# Patient Record
Sex: FEMALE | Race: WHITE | NOT HISPANIC OR LATINO | Employment: UNEMPLOYED | URBAN - NONMETROPOLITAN AREA
[De-identification: names, ages, dates, MRNs, and addresses within clinical notes are randomized per-mention and may not be internally consistent; named-entity substitution may affect disease eponyms.]

---

## 2017-01-03 RX ORDER — POTASSIUM CHLORIDE 750 MG/1
TABLET, FILM COATED, EXTENDED RELEASE ORAL
Qty: 30 TABLET | Refills: 7 | Status: SHIPPED | OUTPATIENT
Start: 2017-01-03 | End: 2020-02-11 | Stop reason: ALTCHOICE

## 2017-01-03 RX ORDER — MINOXIDIL 10 MG/1
TABLET ORAL
Qty: 30 TABLET | Refills: 7 | Status: SHIPPED | OUTPATIENT
Start: 2017-01-03 | End: 2020-02-11 | Stop reason: SDUPTHER

## 2017-03-30 ENCOUNTER — TELEPHONE (OUTPATIENT)
Dept: CARDIOLOGY | Facility: CLINIC | Age: 62
End: 2017-03-30

## 2017-03-30 RX ORDER — VALSARTAN AND HYDROCHLOROTHIAZIDE 320; 25 MG/1; MG/1
1 TABLET, FILM COATED ORAL DAILY
Qty: 90 TABLET | Refills: 1 | Status: SHIPPED | OUTPATIENT
Start: 2017-03-30 | End: 2018-03-30

## 2017-03-30 NOTE — TELEPHONE ENCOUNTER
Per Kyleigh Bebeto change valsartan hctz from 300/12.5 to Valsartan/HCTZ 320/25 mg daily. Script for Valsartan/HCTZ 320/25mg daily to pharmacy.

## 2017-03-30 NOTE — TELEPHONE ENCOUNTER
Trina from [facility] pharmacy called stating Edarbyclor 40-25mg is no longer covered on insurance and insurance requesting to change to valsartan/HCTZ, Irbesartan/HCTZ, or Lisinopril/HCTZ. What is your recommendations?

## 2017-03-30 NOTE — TELEPHONE ENCOUNTER
[facility] pharmacy has the same program as the [facility] yet Trina at [facility] states patient reports that she is unable to pay the 25.00 co pay and is requesting to change the medication.

## 2018-02-15 ENCOUNTER — OFFICE VISIT (OUTPATIENT)
Dept: CARDIOLOGY | Facility: CLINIC | Age: 63
End: 2018-02-15

## 2018-02-15 VITALS
DIASTOLIC BLOOD PRESSURE: 90 MMHG | HEIGHT: 58 IN | BODY MASS INDEX: 30.02 KG/M2 | WEIGHT: 143 LBS | HEART RATE: 76 BPM | SYSTOLIC BLOOD PRESSURE: 150 MMHG

## 2018-02-15 DIAGNOSIS — F32.89 OTHER DEPRESSION: ICD-10-CM

## 2018-02-15 DIAGNOSIS — M19.90 ARTHRITIS: ICD-10-CM

## 2018-02-15 DIAGNOSIS — I10 ESSENTIAL HYPERTENSION: Primary | ICD-10-CM

## 2018-02-15 DIAGNOSIS — M25.541 ARTHRALGIA OF BOTH HANDS: ICD-10-CM

## 2018-02-15 DIAGNOSIS — E11.9 TYPE 2 DIABETES MELLITUS WITHOUT COMPLICATION, WITHOUT LONG-TERM CURRENT USE OF INSULIN (HCC): ICD-10-CM

## 2018-02-15 DIAGNOSIS — E78.00 PURE HYPERCHOLESTEROLEMIA: ICD-10-CM

## 2018-02-15 DIAGNOSIS — E03.9 ACQUIRED HYPOTHYROIDISM: ICD-10-CM

## 2018-02-15 DIAGNOSIS — M25.542 ARTHRALGIA OF BOTH HANDS: ICD-10-CM

## 2018-02-15 PROCEDURE — 99214 OFFICE O/P EST MOD 30 MIN: CPT | Performed by: NURSE PRACTITIONER

## 2018-02-15 RX ORDER — LEVOTHYROXINE SODIUM 0.07 MG/1
75 TABLET ORAL DAILY
COMMUNITY
End: 2020-02-11 | Stop reason: ALTCHOICE

## 2018-02-15 RX ORDER — DULOXETIN HYDROCHLORIDE 30 MG/1
30 CAPSULE, DELAYED RELEASE ORAL 2 TIMES DAILY
COMMUNITY
End: 2020-08-11 | Stop reason: ALTCHOICE

## 2018-02-15 NOTE — PROGRESS NOTES
Chief Complaint   Patient presents with   • Follow-up     For cardiac management. Last lab work done on 02/08/18.    • Med Refill     PCP does medication refills.        Cardiac Complaints  none      Subjective   Ana Harry is a 62 y.o. female with hypertension, diabetes, hypothyroidism, and hypercholesterolemia. Most recent stress from 2016 showed normal ischemic burden and good LV function.  She had been treated for sometime on edarbyclor therapy for HTN management, but insurance stopped paying and she was changed to diovan/HCTZ.  She has tolerated well.  She returns today for follow up and denies any new cardiac concerns.  Chest pain, shortness of breath, and palpitations are denied.  She does admit to some depression since the death of her sister in September and has had a difficult time dealing with it.She also reports her main concerns are related to her arthritis as she has been having a great deal of joint pain in her hands. Labs were recently done with PCP and most recent to the chart from September show a K  4.7, H/H 14.4/44.3,  AIC of 6.3%, TSH 6.53, HDL 59, and LDL of 121.  No refills are needed today as she reports that they were recently done with PCP.        Cardiac History  Past Surgical History:   Procedure Laterality Date   • CARDIOVASCULAR STRESS TEST  04/24/2002    7 min, 86% THR, Neg   • CARDIOVASCULAR STRESS TEST  03/23/2010    4 min, 30 sec 88% THR. /88. Neg   • CARDIOVASCULAR STRESS TEST  07/20/2012    L. Myoview- Negative   • CARDIOVASCULAR STRESS TEST  01/28/2016    R.Stress- 7 Min, 91% THR. /104. Negative   • CONVERTED (HISTORICAL) HOLTER  03/30/2004    AVG HR 68BPM   • ECHO - CONVERTED  09/17/2007    EF >60% RVSP 42 mmHg   • ECHO - CONVERTED  11/17/2008    EF >60%. RVSP 37 mmHg   • ECHO - CONVERTED  07/08/2010    EF 65% RVSP- 50 mmHg   • ECHO - CONVERTED  10/17/2011    EF >60% RVSP-43 mmHg   • ECHO - CONVERTED  03/13/2014    EF 65% RVSP- 26mmHg   • ECHO - CONVERTED   01/12/2016    EF 65%   • US CAROTID UNILATERAL  03/20/2014    Normal       Current Outpatient Prescriptions   Medication Sig Dispense Refill   • aspirin  MG tablet Take 325 mg by mouth daily.     • clonazePAM (KLONOPIN) 1 MG tablet Take 1 mg by mouth every night.     • cloNIDine (CATAPRES) 0.2 MG tablet Take 0.2 mg by mouth 2 (two) times a day.     • digoxin (LANOXIN) 250 MCG tablet Take 250 mcg by mouth every day.     • DULoxetine (CYMBALTA) 30 MG capsule Take 30 mg by mouth 2 (Two) Times a Day.     • fluticasone (VERAMYST) 27.5 MCG/SPRAY nasal spray 2 sprays into each nostril daily.     • furosemide (LASIX) 40 MG tablet TAKE 1 TABLET AS NEEDED 30 tablet 7   • gabapentin (NEURONTIN) 800 MG tablet Take 800 mg by mouth 3 (three) times a day.     • glipiZIDE (GLUCOTROL) 2.5 MG 24 hr tablet Take 2.5 mg by mouth daily.     • hydroxychloroquine (PLAQUENIL) 200 MG tablet Take  by mouth 2 (two) times a day.     • levothyroxine (SYNTHROID, LEVOTHROID) 75 MCG tablet Take 75 mcg by mouth Daily.     • loratadine (CLARITIN) 10 MG tablet Take 10 mg by mouth daily.     • meloxicam (MOBIC) 15 MG tablet Take 15 mg by mouth daily.     • metFORMIN (GLUCOPHAGE) 500 MG tablet Take 500 mg by mouth 2 (two) times a day with meals.     • minoxidil (LONITEN) 10 MG tablet TAKE ONE TABLET BY MOUTH DAILY 30 tablet 7   • NIFEdipine XL (PROCARDIA XL) 90 MG 24 hr tablet Take 90 mg by mouth daily.     • potassium chloride (K-DUR) 10 MEQ CR tablet TAKE 1 TABLET DAILY AS NEEDED 30 tablet 7   • valsartan-hydrochlorothiazide (DIOVAN-HCT) 320-25 MG per tablet Take 1 tablet by mouth Daily. 90 tablet 1   • pravastatin (PRAVACHOL) 80 MG tablet Take 80 mg by mouth daily.       No current facility-administered medications for this visit.        Codeine; Iodine; and Morphine and related    Past Medical History:   Diagnosis Date   • Aortic insufficiency    • Diabetes mellitus    • Hyperlipidemia    • Hypertension    • Palpitations    • Restless legs  "syndrome (RLS)     followed by Dr Atkins       Social History     Social History   • Marital status:      Spouse name: N/A   • Number of children: N/A   • Years of education: N/A     Occupational History   • Not on file.     Social History Main Topics   • Smoking status: Never Smoker   • Smokeless tobacco: Never Used   • Alcohol use No   • Drug use: No   • Sexual activity: Not on file     Other Topics Concern   • Not on file     Social History Narrative       Family History   Problem Relation Age of Onset   • Diabetes Mother    • Heart disease Mother        Review of Systems   Constitution: Negative for weakness and malaise/fatigue.   Cardiovascular: Negative for chest pain, dyspnea on exertion, leg swelling, palpitations and syncope.   Respiratory: Negative for shortness of breath and wheezing.    Musculoskeletal: Positive for arthritis, joint pain and joint swelling.   Gastrointestinal: Negative for anorexia and heartburn.   Genitourinary: Negative for dysuria, hesitancy and nocturia.   Neurological: Negative for dizziness, light-headedness and loss of balance.   Psychiatric/Behavioral: Positive for depression. Negative for memory loss. The patient is not nervous/anxious.        Diabetes YES  Thyroidabnormal    Objective     /90 (BP Location: Left arm)  Pulse 76  Ht 147.3 cm (57.99\")  Wt 64.9 kg (143 lb)  BMI 29.9 kg/m2    Physical Exam   Constitutional: She is oriented to person, place, and time. She appears well-developed and well-nourished.   HENT:   Head: Normocephalic and atraumatic.   Eyes: EOM are normal. Pupils are equal, round, and reactive to light.   Neck: Normal range of motion. Neck supple.   Cardiovascular: Normal rate and regular rhythm.    Murmur heard.  Pulmonary/Chest: Effort normal and breath sounds normal.   Abdominal: Soft.   Musculoskeletal: Normal range of motion.   Neurological: She is alert and oriented to person, place, and time.   Skin: Skin is warm and dry. "   Psychiatric: She has a normal mood and affect. Her behavior is normal.       Procedures    Assessment/Plan     HR is stable today.  BP is elevated at 150/90, she states since she has been taking her valsartan/HCTZ it has been very well controlled at home.  It is elevated today, but she admits to not having her valsartan/HCTZ as she would be in the car today and did not take it.  Most recent TSH available for review was elevated at 6.53, she states you had added back a low dose of synthroid.  Labs also showed an elevated LDL of 121, discussed with her this may be because of the hypothyroidism but could be her diet also.  She is taking her pravachol now at 80mg QHS but admits she had not been taking her medication nightly to save money.  Most recent AIC reported at 6.3%, she continues on metformin and glipizide.  Medication compliance discussed.  She has been watching her diet closer and her weight is now down about 9 pounds.  She does admit to walking more and states she does so without cardiac concerns.  She continues to have issues with arthritis pain, and reports  she did follow with rheumatology but is no longer following due to costs.  She does report labs will be done again soon.  Could we get next for our records?  No refills are needed today as they were filled recently with you.  No new cardiac workup advised as no new concerns voiced.  6 month follow up scheduled or sooner if needed.    Problems Addressed this Visit        Cardiovascular and Mediastinum    Essential hypertension - Primary    Pure hypercholesterolemia       Endocrine    Type 2 diabetes mellitus without complication, without long-term current use of insulin    Acquired hypothyroidism    Relevant Medications    levothyroxine (SYNTHROID, LEVOTHROID) 75 MCG tablet       Musculoskeletal and Integument    Arthritis      Other Visit Diagnoses     Arthralgia of both hands        Other depression        Relevant Medications    DULoxetine (CYMBALTA)  30 MG capsule                  Electronically signed by Kyleigh Tate, APRN February 15, 2018 3:41 PM

## 2019-02-18 ENCOUNTER — TELEPHONE (OUTPATIENT)
Dept: CARDIOLOGY | Facility: CLINIC | Age: 64
End: 2019-02-18

## 2019-02-18 ENCOUNTER — OFFICE VISIT (OUTPATIENT)
Dept: CARDIOLOGY | Facility: CLINIC | Age: 64
End: 2019-02-18

## 2019-02-18 VITALS
DIASTOLIC BLOOD PRESSURE: 90 MMHG | BODY MASS INDEX: 31.49 KG/M2 | HEART RATE: 72 BPM | WEIGHT: 150 LBS | HEIGHT: 58 IN | SYSTOLIC BLOOD PRESSURE: 168 MMHG

## 2019-02-18 DIAGNOSIS — M19.90 ARTHRITIS: ICD-10-CM

## 2019-02-18 DIAGNOSIS — I25.6 SILENT MYOCARDIAL ISCHEMIA: ICD-10-CM

## 2019-02-18 DIAGNOSIS — E78.00 PURE HYPERCHOLESTEROLEMIA: ICD-10-CM

## 2019-02-18 DIAGNOSIS — E03.9 ACQUIRED HYPOTHYROIDISM: ICD-10-CM

## 2019-02-18 DIAGNOSIS — R00.2 PALPITATIONS: Primary | ICD-10-CM

## 2019-02-18 DIAGNOSIS — E66.9 OBESITY (BMI 30.0-34.9): ICD-10-CM

## 2019-02-18 DIAGNOSIS — E11.9 TYPE 2 DIABETES MELLITUS WITHOUT COMPLICATION, WITHOUT LONG-TERM CURRENT USE OF INSULIN (HCC): ICD-10-CM

## 2019-02-18 DIAGNOSIS — I10 ESSENTIAL HYPERTENSION: ICD-10-CM

## 2019-02-18 DIAGNOSIS — I20.8 ANGINAL EQUIVALENT (HCC): ICD-10-CM

## 2019-02-18 DIAGNOSIS — R06.02 SHORTNESS OF BREATH: ICD-10-CM

## 2019-02-18 PROCEDURE — 99214 OFFICE O/P EST MOD 30 MIN: CPT | Performed by: NURSE PRACTITIONER

## 2019-02-18 RX ORDER — LOSARTAN POTASSIUM AND HYDROCHLOROTHIAZIDE 25; 100 MG/1; MG/1
1 TABLET ORAL DAILY
Qty: 30 TABLET | Refills: 8
Start: 2019-02-18 | End: 2020-02-11 | Stop reason: ALTCHOICE

## 2019-02-18 RX ORDER — CARVEDILOL 3.12 MG/1
3.12 TABLET ORAL 2 TIMES DAILY
Qty: 180 TABLET | Refills: 3 | Status: SHIPPED | OUTPATIENT
Start: 2019-02-18 | End: 2019-05-19

## 2019-02-18 RX ORDER — DIGOXIN 125 MCG
125 TABLET ORAL DAILY
Qty: 90 TABLET | Refills: 2 | Status: SHIPPED | OUTPATIENT
Start: 2019-02-18 | End: 2020-02-11 | Stop reason: ALTCHOICE

## 2019-02-18 RX ORDER — LOSARTAN POTASSIUM AND HYDROCHLOROTHIAZIDE 25; 100 MG/1; MG/1
1 TABLET ORAL DAILY
COMMUNITY
End: 2019-02-18 | Stop reason: HOSPADM

## 2019-02-18 NOTE — TELEPHONE ENCOUNTER
Please advise patient digoxin sent as 0.125mg as she is going to be taking beta blocker as well for better blood pressure control cut down from 0.250mg.

## 2019-02-18 NOTE — PROGRESS NOTES
Chief Complaint   Patient presents with   • Follow-up     For cardiac management. Patient is on aspirin. Reports that sometimes her heart races, reports that it doesn't happen every day. Last lab work was done in October per PCP, not in chart.    • Med Refill     Refills with PCP       Cardiac Complaints  dyspnea and palpitations      Subjective   Ana Harry is a 63 y.o. female with hypertension, diabetes, hypothyroidism, and hypercholesterolemia. Most recent stress from 2016 showed normal ischemic burden and good LV function.  She had been treated for sometime on edarbyclor therapy for HTN management, but insurance stopped paying and she was changed to diovan/HCTZ, she then states diovan was changed by insurance to losartan.  She has tolerated well.  Most recent cardiac workup in 2016 was negative for ischemia and good LV function noted.  She returns today for follow up and reports issues with palpitations in the form of her heart racing at times.  She admits it does not happen every day but does happen several times a week. She reports some shortness of breath with exertion but states it is no worse than prior. She admits that blood pressure medication was changed by her insurance company when they reported a problem with valsartan therapy.  She states since that time, her blood pressure has been hard to control with readings as high as 190-200 systolic.  She does use clonidine if needed for elevated readings. Labs she admits remain monitored by PCP, no recent available for review.  No refills currently needed.             Cardiac History  Past Surgical History:   Procedure Laterality Date   • CARDIOVASCULAR STRESS TEST  04/24/2002    7 min, 86% THR, Neg   • CARDIOVASCULAR STRESS TEST  03/23/2010    4 min, 30 sec 88% THR. /88. Neg   • CARDIOVASCULAR STRESS TEST  07/20/2012    L. Myoview- Negative   • CARDIOVASCULAR STRESS TEST  01/28/2016    R.Stress- 7 Min, 91% THR. /104. Negative   • CONVERTED  (HISTORICAL) HOLTER  03/30/2004    AVG HR 68BPM   • ECHO - CONVERTED  09/17/2007    EF >60% RVSP 42 mmHg   • ECHO - CONVERTED  11/17/2008    EF >60%. RVSP 37 mmHg   • ECHO - CONVERTED  07/08/2010    EF 65% RVSP- 50 mmHg   • ECHO - CONVERTED  10/17/2011    EF >60% RVSP-43 mmHg   • ECHO - CONVERTED  03/13/2014    EF 65% RVSP- 26mmHg   • ECHO - CONVERTED  01/12/2016    EF 65%   • US CAROTID UNILATERAL  03/20/2014    Normal       Current Outpatient Medications   Medication Sig Dispense Refill   • aspirin  MG tablet Take 325 mg by mouth daily.     • cloNIDine (CATAPRES) 0.2 MG tablet Take 0.2 mg by mouth 3 (Three) Times a Day.     • DULoxetine (CYMBALTA) 30 MG capsule Take 30 mg by mouth 2 (Two) Times a Day.     • fluticasone (VERAMYST) 27.5 MCG/SPRAY nasal spray 2 sprays into each nostril daily.     • furosemide (LASIX) 40 MG tablet TAKE 1 TABLET AS NEEDED 30 tablet 7   • gabapentin (NEURONTIN) 800 MG tablet Take 800 mg by mouth 3 (three) times a day.     • glipiZIDE (GLUCOTROL) 2.5 MG 24 hr tablet Take 2.5 mg by mouth 2 (Two) Times a Day.     • hydroxychloroquine (PLAQUENIL) 200 MG tablet Take  by mouth 2 (two) times a day.     • meloxicam (MOBIC) 15 MG tablet Take 15 mg by mouth daily.     • metFORMIN (GLUCOPHAGE) 500 MG tablet Take 500 mg by mouth 2 (two) times a day with meals.     • minoxidil (LONITEN) 10 MG tablet TAKE ONE TABLET BY MOUTH DAILY 30 tablet 7   • NIFEdipine XL (PROCARDIA XL) 90 MG 24 hr tablet Take 90 mg by mouth daily.     • pravastatin (PRAVACHOL) 80 MG tablet Take 80 mg by mouth daily.     • carvedilol (COREG) 3.125 MG tablet Take 1 tablet by mouth 2 (Two) Times a Day for 90 days. 180 tablet 3   • clonazePAM (KLONOPIN) 1 MG tablet Take 1 mg by mouth every night.     • digoxin (LANOXIN) 125 MCG tablet Take 1 tablet by mouth Daily. 90 tablet 2   • levothyroxine (SYNTHROID, LEVOTHROID) 75 MCG tablet Take 75 mcg by mouth Daily.     • loratadine (CLARITIN) 10 MG tablet Take 10 mg by mouth daily.      • losartan-hydrochlorothiazide (HYZAAR) 100-25 MG per tablet Take 1 tablet by mouth Daily. 30 tablet 8   • potassium chloride (K-DUR) 10 MEQ CR tablet TAKE 1 TABLET DAILY AS NEEDED 30 tablet 7     No current facility-administered medications for this visit.        Codeine; Iodine; Morphine and related; and Other    Past Medical History:   Diagnosis Date   • Aortic insufficiency    • Diabetes mellitus (CMS/HCC)    • Hyperlipidemia    • Hypertension    • Palpitations    • Restless legs syndrome (RLS)     followed by Dr Atkins       Social History     Socioeconomic History   • Marital status:      Spouse name: Not on file   • Number of children: Not on file   • Years of education: Not on file   • Highest education level: Not on file   Social Needs   • Financial resource strain: Not on file   • Food insecurity - worry: Not on file   • Food insecurity - inability: Not on file   • Transportation needs - medical: Not on file   • Transportation needs - non-medical: Not on file   Occupational History   • Not on file   Tobacco Use   • Smoking status: Never Smoker   • Smokeless tobacco: Never Used   Substance and Sexual Activity   • Alcohol use: No   • Drug use: No   • Sexual activity: Not on file   Other Topics Concern   • Not on file   Social History Narrative   • Not on file       Family History   Problem Relation Age of Onset   • Diabetes Mother    • Heart disease Mother        Review of Systems   Constitution: Negative for weakness and malaise/fatigue.   Cardiovascular: Positive for dyspnea on exertion and palpitations. Negative for chest pain, claudication, irregular heartbeat, leg swelling, near-syncope, orthopnea and syncope.   Respiratory: Positive for shortness of breath. Negative for cough and wheezing.    Musculoskeletal: Positive for arthritis, joint pain and joint swelling.   Gastrointestinal: Negative for anorexia, heartburn, nausea and vomiting.   Genitourinary: Negative for dysuria, hematuria,  "hesitancy and nocturia.   Neurological: Negative for dizziness, light-headedness and loss of balance.   Psychiatric/Behavioral: Negative for depression and memory loss. The patient is not nervous/anxious.            Objective     /90 (BP Location: Left arm)   Pulse 72   Ht 147.3 cm (57.99\")   Wt 68 kg (150 lb)   BMI 31.36 kg/m²     Physical Exam   Constitutional: She is oriented to person, place, and time. She appears well-developed and well-nourished.   HENT:   Head: Normocephalic and atraumatic.   Eyes: EOM are normal. Pupils are equal, round, and reactive to light.   Neck: Normal range of motion. Neck supple.   Cardiovascular: Normal rate and regular rhythm.   Pulmonary/Chest: Effort normal and breath sounds normal.   Abdominal: Soft.   Musculoskeletal: Normal range of motion.   Neurological: She is alert and oriented to person, place, and time.   Skin: Skin is warm and dry.   Psychiatric: She has a normal mood and affect. Her behavior is normal.       Procedures    Assessment/Plan     HR is stable today and regular.  Blood pressure is not well controlled. Since patient reports palpitations described as heart racing and elevated blood pressure noted, coreg will be added at 3.125mg BID to current regimen.  To avoid marked bradycardia, digoxin dosing will be decreased to 0.125mg daily.  She was urged to continue to monitor blood pressure at home and use her clonidine if SBP > or equal to 160.  Repeat cardiac workup with modified stress mirian and echo will be ordered to assess functional status, for any arrhythmias, ischemic burden, or structural concerns as it has been 3 years since last workup and risk of silent ischemic burden present, along with multiple risk factors for CAD.  More recommendations to follow.  For hyperlipidemia management, she has remained on statin therapy and tolerates well, side effects are denied. For now, no adjustment to current regimen recommended.  Labs she admits are done with " your office including FLP for hyperlipidemia and AIC for DM management.  Could we have next for our review?  BMI remains elevated at over 30 and patient admits to a high sodium/high carb diet.  She was counseled on the importance to limit both for better HTN control and DM management.  She was also urged to limit her caloric intake and increase physical activity as tolerated.  6 month follow up scheduled or sooner if needed.        Problems Addressed this Visit        Cardiovascular and Mediastinum    Essential hypertension    Relevant Medications    losartan-hydrochlorothiazide (HYZAAR) 100-25 MG per tablet    carvedilol (COREG) 3.125 MG tablet    Pure hypercholesterolemia       Endocrine    Type 2 diabetes mellitus without complication, without long-term current use of insulin (CMS/HCC)    Acquired hypothyroidism    Relevant Medications    carvedilol (COREG) 3.125 MG tablet       Musculoskeletal and Integument    Arthritis      Other Visit Diagnoses     Palpitations    -  Primary    Relevant Orders    Adult Transthoracic Echo Complete W/ Cont if Necessary Per Protocol    Stress Test With Myocardial Perfusion One Day    Shortness of breath        Relevant Orders    Adult Transthoracic Echo Complete W/ Cont if Necessary Per Protocol    Stress Test With Myocardial Perfusion One Day    Anginal equivalent (CMS/HCC)        Relevant Medications    carvedilol (COREG) 3.125 MG tablet    digoxin (LANOXIN) 125 MCG tablet    Other Relevant Orders    Adult Transthoracic Echo Complete W/ Cont if Necessary Per Protocol    Stress Test With Myocardial Perfusion One Day    Silent myocardial ischemia        Relevant Medications    carvedilol (COREG) 3.125 MG tablet    digoxin (LANOXIN) 125 MCG tablet    Other Relevant Orders    Adult Transthoracic Echo Complete W/ Cont if Necessary Per Protocol    Stress Test With Myocardial Perfusion One Day    Obesity (BMI 30.0-34.9)              Patient's Body mass index is 31.36 kg/m². BMI is  above normal parameters. Recommendations include: nutrition counseling.            Electronically signed by CHARLES Harden February 18, 2019 4:10 PM

## 2019-02-18 NOTE — TELEPHONE ENCOUNTER
Patient aware of dose adjustment of digoxin 0.125 mg QD from 0.250 mg due to addition of beta blocker.

## 2019-03-05 ENCOUNTER — HOSPITAL ENCOUNTER (OUTPATIENT)
Dept: CARDIOLOGY | Facility: HOSPITAL | Age: 64
Discharge: HOME | End: 2019-03-05

## 2019-03-05 DIAGNOSIS — I25.6 SILENT MYOCARDIAL ISCHEMIA: ICD-10-CM

## 2019-03-05 DIAGNOSIS — I20.8 ANGINAL EQUIVALENT (HCC): ICD-10-CM

## 2019-03-05 DIAGNOSIS — R06.02 SHORTNESS OF BREATH: ICD-10-CM

## 2019-03-05 DIAGNOSIS — R00.2 PALPITATIONS: ICD-10-CM

## 2019-03-05 LAB
BH CV ECHO MEAS - ACS: 2.4 CM
BH CV ECHO MEAS - AO MEAN PG (FULL): 1.2 MMHG
BH CV ECHO MEAS - AO MEAN PG: 3 MMHG
BH CV ECHO MEAS - AO ROOT AREA (BSA CORRECTED): 2
BH CV ECHO MEAS - AO ROOT AREA: 7.6 CM^2
BH CV ECHO MEAS - AO ROOT DIAM: 3.1 CM
BH CV ECHO MEAS - AO V2 MEAN: 82.8 CM/SEC
BH CV ECHO MEAS - AO V2 VTI: 27.3 CM
BH CV ECHO MEAS - AVA(I,A): 2.4 CM^2
BH CV ECHO MEAS - AVA(I,D): 2.4 CM^2
BH CV ECHO MEAS - BSA(HAYCOCK): 1.7 M^2
BH CV ECHO MEAS - BSA: 1.6 M^2
BH CV ECHO MEAS - BZI_BMI: 32.5 KILOGRAMS/M^2
BH CV ECHO MEAS - BZI_METRIC_HEIGHT: 144.8 CM
BH CV ECHO MEAS - BZI_METRIC_WEIGHT: 68 KG
BH CV ECHO MEAS - EDV(CUBED): 69.5 ML
BH CV ECHO MEAS - EDV(MOD-SP4): 63 ML
BH CV ECHO MEAS - EDV(TEICH): 74.7 ML
BH CV ECHO MEAS - EF(CUBED): 79 %
BH CV ECHO MEAS - EF(MOD-SP4): 79.4 %
BH CV ECHO MEAS - EF(TEICH): 71.8 %
BH CV ECHO MEAS - ESV(CUBED): 14.6 ML
BH CV ECHO MEAS - ESV(MOD-SP4): 13 ML
BH CV ECHO MEAS - ESV(TEICH): 21.1 ML
BH CV ECHO MEAS - FS: 40.6 %
BH CV ECHO MEAS - IVS/LVPW: 1.3
BH CV ECHO MEAS - IVSD: 1.7 CM
BH CV ECHO MEAS - LA DIMENSION: 3.8 CM
BH CV ECHO MEAS - LA/AO: 1.2
BH CV ECHO MEAS - LV DIASTOLIC VOL/BSA (35-75): 39.6 ML/M^2
BH CV ECHO MEAS - LV IVRT: 0.12 SEC
BH CV ECHO MEAS - LV MASS(C)D: 235.9 GRAMS
BH CV ECHO MEAS - LV MASS(C)DI: 148.3 GRAMS/M^2
BH CV ECHO MEAS - LV MEAN PG: 1.7 MMHG
BH CV ECHO MEAS - LV SYSTOLIC VOL/BSA (12-30): 8.2 ML/M^2
BH CV ECHO MEAS - LV V1 MEAN: 61.2 CM/SEC
BH CV ECHO MEAS - LV V1 VTI: 20.7 CM
BH CV ECHO MEAS - LVIDD: 4.1 CM
BH CV ECHO MEAS - LVIDS: 2.4 CM
BH CV ECHO MEAS - LVLD AP4: 6.1 CM
BH CV ECHO MEAS - LVLS AP4: 4.7 CM
BH CV ECHO MEAS - LVOT AREA (M): 3.1 CM^2
BH CV ECHO MEAS - LVOT AREA: 3.1 CM^2
BH CV ECHO MEAS - LVOT DIAM: 2 CM
BH CV ECHO MEAS - LVPWD: 1.3 CM
BH CV ECHO MEAS - MV A MAX VEL: 112 CM/SEC
BH CV ECHO MEAS - MV DEC SLOPE: 429 CM/SEC^2
BH CV ECHO MEAS - MV E MAX VEL: 118.5 CM/SEC
BH CV ECHO MEAS - MV E/A: 1.1
BH CV ECHO MEAS - MV MEAN PG: 2.4 MMHG
BH CV ECHO MEAS - MV P1/2T MAX VEL: 129 CM/SEC
BH CV ECHO MEAS - MV P1/2T: 88 MSEC
BH CV ECHO MEAS - MV V2 MEAN: 70.9 CM/SEC
BH CV ECHO MEAS - MV V2 VTI: 46.1 CM
BH CV ECHO MEAS - MVA P1/2T LCG: 1.7 CM^2
BH CV ECHO MEAS - MVA(P1/2T): 2.5 CM^2
BH CV ECHO MEAS - MVA(VTI): 1.4 CM^2
BH CV ECHO MEAS - RAP SYSTOLE: 10 MMHG
BH CV ECHO MEAS - RVDD: 2.8 CM
BH CV ECHO MEAS - RVSP: 31.3 MMHG
BH CV ECHO MEAS - SI(AO): 130.9 ML/M^2
BH CV ECHO MEAS - SI(CUBED): 34.5 ML/M^2
BH CV ECHO MEAS - SI(LVOT): 40.8 ML/M^2
BH CV ECHO MEAS - SI(MOD-SP4): 31.4 ML/M^2
BH CV ECHO MEAS - SI(TEICH): 33.7 ML/M^2
BH CV ECHO MEAS - SV(AO): 208.4 ML
BH CV ECHO MEAS - SV(CUBED): 54.9 ML
BH CV ECHO MEAS - SV(LVOT): 64.9 ML
BH CV ECHO MEAS - SV(MOD-SP4): 50 ML
BH CV ECHO MEAS - SV(TEICH): 53.6 ML
BH CV ECHO MEAS - TR MAX VEL: 230.8 CM/SEC
BH CV STRESS RECOVERY BP: NORMAL MMHG
BH CV STRESS RECOVERY HR: 90 BPM
LV EF NUC BP: 64 %
MAXIMAL PREDICTED HEART RATE: 156 BPM
MAXIMAL PREDICTED HEART RATE: 156 BPM
PERCENT MAX PREDICTED HR: 86.54 %
STRESS BASELINE BP: NORMAL MMHG
STRESS BASELINE HR: 63 BPM
STRESS PERCENT HR: 102 %
STRESS POST ESTIMATED WORKLOAD: 10.1 METS
STRESS POST EXERCISE DUR MIN: 8 MIN
STRESS POST EXERCISE DUR SEC: 1 SEC
STRESS POST PEAK BP: NORMAL MMHG
STRESS POST PEAK HR: 135 BPM
STRESS TARGET HR: 133 BPM
STRESS TARGET HR: 133 BPM

## 2019-03-05 PROCEDURE — 93306 TTE W/DOPPLER COMPLETE: CPT

## 2019-03-05 PROCEDURE — 78452 HT MUSCLE IMAGE SPECT MULT: CPT | Performed by: INTERNAL MEDICINE

## 2019-03-05 PROCEDURE — 93306 TTE W/DOPPLER COMPLETE: CPT | Performed by: INTERNAL MEDICINE

## 2019-03-05 PROCEDURE — 0 TECHNETIUM SESTAMIBI: Performed by: INTERNAL MEDICINE

## 2019-03-05 PROCEDURE — A9500 TC99M SESTAMIBI: HCPCS | Performed by: INTERNAL MEDICINE

## 2019-03-05 PROCEDURE — 78452 HT MUSCLE IMAGE SPECT MULT: CPT

## 2019-03-05 PROCEDURE — 93017 CV STRESS TEST TRACING ONLY: CPT

## 2019-03-05 PROCEDURE — 93018 CV STRESS TEST I&R ONLY: CPT | Performed by: INTERNAL MEDICINE

## 2019-03-05 RX ADMIN — TECHNETIUM TC 99M SESTAMIBI 1 DOSE: 1 INJECTION INTRAVENOUS at 10:27

## 2019-03-05 RX ADMIN — TECHNETIUM TC 99M SESTAMIBI 1 DOSE: 1 INJECTION INTRAVENOUS at 10:25

## 2019-08-27 ENCOUNTER — TELEPHONE (OUTPATIENT)
Dept: CARDIOLOGY | Facility: CLINIC | Age: 64
End: 2019-08-27

## 2019-08-27 NOTE — TELEPHONE ENCOUNTER
Got sent an KAVYA from the foot and ankle doctor.  Patient had mildly abnormal left at 0.97 and right at 1.0.  You are seeing her Feb 2020.  Patient is on ASA therapy as well as statin.  She cancelled most recent appointment with you and rescheduled to Feb.  May recommend walking or add pletal when she is here if claudication noted. Wanted to give you a heads up.  She has a lot of neuropathy issues as well from DM.

## 2020-02-11 ENCOUNTER — OFFICE VISIT (OUTPATIENT)
Dept: CARDIOLOGY | Facility: CLINIC | Age: 65
End: 2020-02-11

## 2020-02-11 VITALS
HEART RATE: 64 BPM | HEIGHT: 58 IN | BODY MASS INDEX: 31.65 KG/M2 | DIASTOLIC BLOOD PRESSURE: 100 MMHG | SYSTOLIC BLOOD PRESSURE: 182 MMHG | WEIGHT: 150.8 LBS

## 2020-02-11 DIAGNOSIS — E11.9 TYPE 2 DIABETES MELLITUS WITHOUT COMPLICATION, WITHOUT LONG-TERM CURRENT USE OF INSULIN (HCC): ICD-10-CM

## 2020-02-11 DIAGNOSIS — M19.90 ARTHRITIS: ICD-10-CM

## 2020-02-11 DIAGNOSIS — E78.00 PURE HYPERCHOLESTEROLEMIA: ICD-10-CM

## 2020-02-11 DIAGNOSIS — I15.0 RENOVASCULAR HYPERTENSION: Primary | ICD-10-CM

## 2020-02-11 DIAGNOSIS — I10 ESSENTIAL HYPERTENSION: ICD-10-CM

## 2020-02-11 DIAGNOSIS — R00.2 PALPITATIONS: ICD-10-CM

## 2020-02-11 PROCEDURE — 99214 OFFICE O/P EST MOD 30 MIN: CPT | Performed by: NURSE PRACTITIONER

## 2020-02-11 RX ORDER — LEVOTHYROXINE SODIUM 0.12 MG/1
250 TABLET ORAL DAILY
COMMUNITY

## 2020-02-11 RX ORDER — DIGOXIN 250 MCG
250 TABLET ORAL
COMMUNITY
End: 2020-02-11 | Stop reason: SDUPTHER

## 2020-02-11 RX ORDER — DIGOXIN 125 MCG
125 TABLET ORAL
Qty: 90 TABLET | Refills: 3 | Status: SHIPPED | OUTPATIENT
Start: 2020-02-11 | End: 2020-12-10

## 2020-02-11 RX ORDER — CARVEDILOL 3.12 MG/1
3.12 TABLET ORAL 2 TIMES DAILY
Qty: 180 TABLET | Refills: 3 | Status: SHIPPED | OUTPATIENT
Start: 2020-02-11 | End: 2020-12-10

## 2020-02-11 RX ORDER — FUROSEMIDE 40 MG/1
40 TABLET ORAL 2 TIMES DAILY PRN
COMMUNITY
End: 2021-08-10 | Stop reason: SDUPTHER

## 2020-02-11 RX ORDER — VALSARTAN AND HYDROCHLOROTHIAZIDE 320; 25 MG/1; MG/1
1 TABLET, FILM COATED ORAL DAILY
COMMUNITY
End: 2021-08-10 | Stop reason: SDUPTHER

## 2020-02-11 RX ORDER — MINOXIDIL 10 MG/1
TABLET ORAL
Qty: 180 TABLET | Refills: 3 | Status: SHIPPED | OUTPATIENT
Start: 2020-02-11 | End: 2020-08-11

## 2020-02-11 NOTE — PATIENT INSTRUCTIONS
"DASH Eating Plan  DASH stands for \"Dietary Approaches to Stop Hypertension.\" The DASH eating plan is a healthy eating plan that has been shown to reduce high blood pressure (hypertension). It may also reduce your risk for type 2 diabetes, heart disease, and stroke. The DASH eating plan may also help with weight loss.  What are tips for following this plan?    General guidelines  · Avoid eating more than 2,300 mg (milligrams) of salt (sodium) a day. If you have hypertension, you may need to reduce your sodium intake to 1,500 mg a day.  · Limit alcohol intake to no more than 1 drink a day for nonpregnant women and 2 drinks a day for men. One drink equals 12 oz of beer, 5 oz of wine, or 1½ oz of hard liquor.  · Work with your health care provider to maintain a healthy body weight or to lose weight. Ask what an ideal weight is for you.  · Get at least 30 minutes of exercise that causes your heart to beat faster (aerobic exercise) most days of the week. Activities may include walking, swimming, or biking.  · Work with your health care provider or diet and nutrition specialist (dietitian) to adjust your eating plan to your individual calorie needs.  Reading food labels    · Check food labels for the amount of sodium per serving. Choose foods with less than 5 percent of the Daily Value of sodium. Generally, foods with less than 300 mg of sodium per serving fit into this eating plan.  · To find whole grains, look for the word \"whole\" as the first word in the ingredient list.  Shopping  · Buy products labeled as \"low-sodium\" or \"no salt added.\"  · Buy fresh foods. Avoid canned foods and premade or frozen meals.  Cooking  · Avoid adding salt when cooking. Use salt-free seasonings or herbs instead of table salt or sea salt. Check with your health care provider or pharmacist before using salt substitutes.  · Do not chauhan foods. Cook foods using healthy methods such as baking, boiling, grilling, and broiling instead.  · Cook with " heart-healthy oils, such as olive, canola, soybean, or sunflower oil.  Meal planning  · Eat a balanced diet that includes:  ? 5 or more servings of fruits and vegetables each day. At each meal, try to fill half of your plate with fruits and vegetables.  ? Up to 6-8 servings of whole grains each day.  ? Less than 6 oz of lean meat, poultry, or fish each day. A 3-oz serving of meat is about the same size as a deck of cards. One egg equals 1 oz.  ? 2 servings of low-fat dairy each day.  ? A serving of nuts, seeds, or beans 5 times each week.  ? Heart-healthy fats. Healthy fats called Omega-3 fatty acids are found in foods such as flaxseeds and coldwater fish, like sardines, salmon, and mackerel.  · Limit how much you eat of the following:  ? Canned or prepackaged foods.  ? Food that is high in trans fat, such as fried foods.  ? Food that is high in saturated fat, such as fatty meat.  ? Sweets, desserts, sugary drinks, and other foods with added sugar.  ? Full-fat dairy products.  · Do not salt foods before eating.  · Try to eat at least 2 vegetarian meals each week.  · Eat more home-cooked food and less restaurant, buffet, and fast food.  · When eating at a restaurant, ask that your food be prepared with less salt or no salt, if possible.  What foods are recommended?  The items listed may not be a complete list. Talk with your dietitian about what dietary choices are best for you.  Grains  Whole-grain or whole-wheat bread. Whole-grain or whole-wheat pasta. Brown rice. Oatmeal. Quinoa. Bulgur. Whole-grain and low-sodium cereals. Marlene bread. Low-fat, low-sodium crackers. Whole-wheat flour tortillas.  Vegetables  Fresh or frozen vegetables (raw, steamed, roasted, or grilled). Low-sodium or reduced-sodium tomato and vegetable juice. Low-sodium or reduced-sodium tomato sauce and tomato paste. Low-sodium or reduced-sodium canned vegetables.  Fruits  All fresh, dried, or frozen fruit. Canned fruit in natural juice (without  added sugar).  Meat and other protein foods  Skinless chicken or turkey. Ground chicken or turkey. Pork with fat trimmed off. Fish and seafood. Egg whites. Dried beans, peas, or lentils. Unsalted nuts, nut butters, and seeds. Unsalted canned beans. Lean cuts of beef with fat trimmed off. Low-sodium, lean deli meat.  Dairy  Low-fat (1%) or fat-free (skim) milk. Fat-free, low-fat, or reduced-fat cheeses. Nonfat, low-sodium ricotta or cottage cheese. Low-fat or nonfat yogurt. Low-fat, low-sodium cheese.  Fats and oils  Soft margarine without trans fats. Vegetable oil. Low-fat, reduced-fat, or light mayonnaise and salad dressings (reduced-sodium). Canola, safflower, olive, soybean, and sunflower oils. Avocado.  Seasoning and other foods  Herbs. Spices. Seasoning mixes without salt. Unsalted popcorn and pretzels. Fat-free sweets.  What foods are not recommended?  The items listed may not be a complete list. Talk with your dietitian about what dietary choices are best for you.  Grains  Baked goods made with fat, such as croissants, muffins, or some breads. Dry pasta or rice meal packs.  Vegetables  Creamed or fried vegetables. Vegetables in a cheese sauce. Regular canned vegetables (not low-sodium or reduced-sodium). Regular canned tomato sauce and paste (not low-sodium or reduced-sodium). Regular tomato and vegetable juice (not low-sodium or reduced-sodium). Pickles. Olives.  Fruits  Canned fruit in a light or heavy syrup. Fried fruit. Fruit in cream or butter sauce.  Meat and other protein foods  Fatty cuts of meat. Ribs. Fried meat. Spears. Sausage. Bologna and other processed lunch meats. Salami. Fatback. Hotdogs. Bratwurst. Salted nuts and seeds. Canned beans with added salt. Canned or smoked fish. Whole eggs or egg yolks. Chicken or turkey with skin.  Dairy  Whole or 2% milk, cream, and half-and-half. Whole or full-fat cream cheese. Whole-fat or sweetened yogurt. Full-fat cheese. Nondairy creamers. Whipped toppings.  Processed cheese and cheese spreads.  Fats and oils  Butter. Stick margarine. Lard. Shortening. Ghee. Spears fat. Tropical oils, such as coconut, palm kernel, or palm oil.  Seasoning and other foods  Salted popcorn and pretzels. Onion salt, garlic salt, seasoned salt, table salt, and sea salt. Worcestershire sauce. Tartar sauce. Barbecue sauce. Teriyaki sauce. Soy sauce, including reduced-sodium. Steak sauce. Canned and packaged gravies. Fish sauce. Oyster sauce. Cocktail sauce. Horseradish that you find on the shelf. Ketchup. Mustard. Meat flavorings and tenderizers. Bouillon cubes. Hot sauce and Tabasco sauce. Premade or packaged marinades. Premade or packaged taco seasonings. Relishes. Regular salad dressings.  Where to find more information:  · National Heart, Lung, and Blood [place]: www.nhlbi.nih.gov  · American Heart Association: www.heart.org  Summary  · The DASH eating plan is a healthy eating plan that has been shown to reduce high blood pressure (hypertension). It may also reduce your risk for type 2 diabetes, heart disease, and stroke.  · With the DASH eating plan, you should limit salt (sodium) intake to 2,300 mg a day. If you have hypertension, you may need to reduce your sodium intake to 1,500 mg a day.  · When on the DASH eating plan, aim to eat more fresh fruits and vegetables, whole grains, lean proteins, low-fat dairy, and heart-healthy fats.  · Work with your health care provider or diet and nutrition specialist (dietitian) to adjust your eating plan to your individual calorie needs.  This information is not intended to replace advice given to you by your health care provider. Make sure you discuss any questions you have with your health care provider.  Document Released: 12/06/2012 Document Revised: 12/11/2017 Document Reviewed: 12/11/2017  [employer] Patient Education © 2019 [employer]

## 2020-02-11 NOTE — PROGRESS NOTES
"Chief Complaint   Patient presents with   • Follow-up     Cardiac management. She states \"I may have to have knee surgery soon\".   • Lab     Last labs a month ago per PCP. She reports PCP stopped Potassium.   • Blood pressure     She reports B/P 180/130 last week, she reports continues to remain elevated.   • Chest Pain     Having occasional dull pain across chest that radiates to neck, short in duration.   • Palpitations     Same as before nothing any different.   • Med Refill     Needs refills on Lanoxin-90 day.       Subjective       Ana Harry is a 64 y.o. female with hypertension, diabetes, hypothyroidism, and hypercholesterolemia. Ischemic work up has been negative with most recent stress 3/2019 showing good exercise tolerance, mild hypertensive response and no ischemia. Her blood pressure has been difficult to control. She was treated with Edarbyclor but insurance stopped coverage and changed to valsartan/HCT then again changed to losartan/HCTZ. Since that time, her blood pressure has been hard to control with reported readings as high as 180. Coreg added last visit.     She came today for follow up. BP continues to be elevated. Med list reviewed. Losartan changed back to Valsartan/HCTZ. Nifedipine, Coreg, Lasix, and minoxidil continued. She denies anginal symptoms and palpitations have been fairly well controlled. She has a lot of arthritis pain, trying to exercise more. She joined a gym, walking a lot. She denies claudication. KAVYA done at podiatry was only borderline abnormal. She is planning to see Dr. oNble 2/21/20 for evaluation of right knee pain. Labs followed by Dr. Fulton.     HPI         Cardiac History:    Past Surgical History:   Procedure Laterality Date   • CARDIOVASCULAR STRESS TEST  04/24/2002    7 min, 86% THR, Neg   • CARDIOVASCULAR STRESS TEST  03/23/2010    4 min, 30 sec 88% THR. /88. Neg   • CARDIOVASCULAR STRESS TEST  07/20/2012    L. Myoview- Negative   • " CARDIOVASCULAR STRESS TEST  01/28/2016    R.Stress- 7 Min, 10.1 METS. 91% THR. /104. Negative   • CARDIOVASCULAR STRESS TEST  03/05/2019    8 Min. 10.1 METS. 87% THR. BP- 170/85. Negative.   • CONVERTED (HISTORICAL) HOLTER  03/30/2004    AVG HR 68BPM   • ECHO - CONVERTED  09/17/2007    EF >60% RVSP 42 mmHg   • ECHO - CONVERTED  11/17/2008    EF >60%. RVSP 37 mmHg   • ECHO - CONVERTED  07/08/2010    EF 65% RVSP- 50 mmHg   • ECHO - CONVERTED  10/17/2011    EF >60% RVSP-43 mmHg   • ECHO - CONVERTED  03/13/2014    EF 65% RVSP- 26mmHg   • ECHO - CONVERTED  01/12/2016    EF 65%   • ECHO - CONVERTED  03/05/2019    EF 65%. Mild MR. RVSP 31 mmHg   • US CAROTID UNILATERAL  03/20/2014    Normal     Current Outpatient Medications   Medication Sig Dispense Refill   • aspirin  MG tablet Take 325 mg by mouth daily.     • cloNIDine (CATAPRES) 0.2 MG tablet Take 0.2 mg by mouth 3 (Three) Times a Day.     • digoxin (LANOXIN) 125 MCG tablet Take 1 tablet by mouth Daily. 90 tablet 3   • DULoxetine (CYMBALTA) 30 MG capsule Take 30 mg by mouth 2 (Two) Times a Day.     • furosemide (LASIX) 40 MG tablet Take 40 mg by mouth 2 (Two) Times a Day.     • gabapentin (NEURONTIN) 800 MG tablet Take 800 mg by mouth 3 (three) times a day.     • glipiZIDE (GLUCOTROL) 2.5 MG 24 hr tablet Take 2.5 mg by mouth 2 (Two) Times a Day.     • levothyroxine (SYNTHROID, LEVOTHROID) 125 MCG tablet Take 125 mcg by mouth Daily.     • meloxicam (MOBIC) 15 MG tablet Take 15 mg by mouth daily.     • metFORMIN (GLUCOPHAGE) 500 MG tablet Take 500 mg by mouth 2 (two) times a day with meals.     • minoxidil (LONITEN) 10 MG tablet One tablet twice daily 180 tablet 3   • NIFEdipine XL (PROCARDIA XL) 90 MG 24 hr tablet Take 90 mg by mouth daily.     • pravastatin (PRAVACHOL) 80 MG tablet Take 80 mg by mouth daily.     • valsartan-hydrochlorothiazide (DIOVAN-HCT) 320-25 MG per tablet Take 1 tablet by mouth Daily.     • carvedilol (COREG) 3.125 MG tablet Take 1  "tablet by mouth 2 (Two) Times a Day. 180 tablet 3     No current facility-administered medications for this visit.      Codeine; Iodine; Morphine and related; and Other    Past Medical History:   Diagnosis Date   • Aortic insufficiency    • Diabetes mellitus (CMS/HCC)    • Hyperlipidemia    • Hypertension    • Palpitations    • Restless legs syndrome (RLS)     followed by Dr Atkins       Social History     Socioeconomic History   • Marital status:      Spouse name: Not on file   • Number of children: Not on file   • Years of education: Not on file   • Highest education level: Not on file   Tobacco Use   • Smoking status: Never Smoker   • Smokeless tobacco: Never Used   Substance and Sexual Activity   • Alcohol use: No   • Drug use: No       Family History   Problem Relation Age of Onset   • Diabetes Mother    • Heart disease Mother      Review of Systems   Constitution: Negative for decreased appetite, malaise/fatigue, weight gain and weight loss.   HENT: Negative.    Eyes: Negative.    Cardiovascular: Positive for palpitations. Negative for chest pain, dyspnea on exertion, leg swelling, orthopnea and syncope.   Respiratory: Negative for cough and shortness of breath.    Endocrine: Negative.    Hematologic/Lymphatic: Negative.    Skin: Negative.    Musculoskeletal: Positive for arthritis and joint pain. Negative for myalgias.   Gastrointestinal: Negative for abdominal pain and melena.   Genitourinary: Negative for dysuria and hematuria.   Neurological: Negative for dizziness.   Psychiatric/Behavioral: Negative for altered mental status and depression.   Allergic/Immunologic: Negative.       Diabetes- Yes  Thyroid-normal    Objective     BP (!) 182/100 (BP Location: Left arm)   Pulse 64   Ht 147.3 cm (57.99\")   Wt 68.4 kg (150 lb 12.8 oz)   BMI 31.53 kg/m²     Physical Exam   Constitutional: She is oriented to person, place, and time. She appears well-developed and well-nourished. No distress.   HENT:   "   Head: Normocephalic.   Eyes: Pupils are equal, round, and reactive to light.   Neck: Normal range of motion.   Cardiovascular: Normal rate, regular rhythm and intact distal pulses.   Murmur heard.   Systolic murmur is present with a grade of 1/6 at the apex.  Pulmonary/Chest: Effort normal and breath sounds normal. No respiratory distress. She has no wheezes.   Abdominal: Soft. Bowel sounds are normal.   Musculoskeletal: Normal range of motion.   Neurological: She is alert and oriented to person, place, and time.   Skin: Skin is warm and dry. She is not diaphoretic.   Psychiatric: She has a normal mood and affect.   Nursing note and vitals reviewed.     Procedures          Assessment/Plan      Ana was seen today for follow-up, lab, blood pressure, chest pain, palpitations and med refill.    Diagnoses and all orders for this visit:    Renovascular hypertension  -     US Renal Artery Complete; Future    Pure hypercholesterolemia    Type 2 diabetes mellitus without complication, without long-term current use of insulin (CMS/[facility])    Arthritis    Essential hypertension  -     carvedilol (COREG) 3.125 MG tablet; Take 1 tablet by mouth 2 (Two) Times a Day.  -     minoxidil (LONITEN) 10 MG tablet; One tablet twice daily    Palpitations  -     carvedilol (COREG) 3.125 MG tablet; Take 1 tablet by mouth 2 (Two) Times a Day.  -     digoxin (LANOXIN) 125 MCG tablet; Take 1 tablet by mouth Daily.    1. HTN- uncontrolled. 180/100 today. Increase minoxidil to 10 mg BID. Continue Procardia XL 90 mg, Coreg 3.125 mg BID, clonidine, valsartan/HCTZ, Lasix. She is advised to keep bp log for one week and report. For uncontrolled HTN not responding well to treatment, recommend renal artery US to rule out VALERIANO. Strictly limit Na. Weight loss for BMI closer to 25. Further recommendations to follow. She may benefit hydralazine or spironolactone if electrolytes stable. She may need overnight oximetry.     2. Hyperlipidemia- stable with  pravastatin 80 mg. Labs followed by PCP. Patient reports WNL.     3. Diabetes- oral antidiabetic meds. Continue aspirin, statin, ARB. Low sugar, low carbohydrate diet.     4. Arthritis- she had a lot of questions regarding surgery clearance if knee surgery is planned. Her stress test showed no ischemia and normal LV function. Her blood pressure will need to be better controlled before clearance will be given.     5. Palpitations- well controlled with digoxin and beta blocker.     Could we get a copy of most recent labs? Stress test and echocardiogram reviewed with her. DASH diet given to her.     Patient's Body mass index is 31.53 kg/m². BMI is above normal parameters. Recommendations include: nutrition counseling.                 Electronically signed by CHARLES Mijares,  February 13, 2020 10:25 AM

## 2020-02-25 ENCOUNTER — HOSPITAL ENCOUNTER (OUTPATIENT)
Dept: CARDIOLOGY | Facility: HOSPITAL | Age: 65
Discharge: HOME | End: 2020-02-25
Admitting: NURSE PRACTITIONER

## 2020-02-25 DIAGNOSIS — I15.0 RENOVASCULAR HYPERTENSION: ICD-10-CM

## 2020-02-25 PROCEDURE — 93975 VASCULAR STUDY: CPT

## 2020-02-25 PROCEDURE — 93975 VASCULAR STUDY: CPT | Performed by: RADIOLOGY

## 2020-02-26 ENCOUNTER — TELEPHONE (OUTPATIENT)
Dept: CARDIOLOGY | Facility: CLINIC | Age: 65
End: 2020-02-26

## 2020-02-26 DIAGNOSIS — I10 ESSENTIAL HYPERTENSION: Primary | ICD-10-CM

## 2020-02-26 DIAGNOSIS — I70.1 RENAL ARTERY STENOSIS (HCC): ICD-10-CM

## 2020-02-26 NOTE — TELEPHONE ENCOUNTER
Pt needs CTA renals for uncontrolled HTN and abnormal renal US.     She will need to follow premed protocol.

## 2020-02-27 NOTE — TELEPHONE ENCOUNTER
Order placed for CTA renal arteries    BMP for Cr prior to test    Need premedication for hx of contrast allergy-     Prednisone 50 mg PO 13 hours prior  Prednisone 50 mg PO 7 hours prior   Prednisone 50 mg 1 hour prior    Take Benadryl 50 mg with you for PRN use

## 2020-02-28 RX ORDER — PREDNISONE 50 MG/1
TABLET ORAL
Qty: 3 TABLET | Refills: 0 | Status: SHIPPED | OUTPATIENT
Start: 2020-02-28 | End: 2020-08-11 | Stop reason: ALTCHOICE

## 2020-02-28 NOTE — TELEPHONE ENCOUNTER
"Patient made aware of need for premedication due to hx of contrast dye allergy, Prednisone 50mg 13 hours prior, prednisone 50mg 7 hours prior, and prednisone 50mg 1hour prior to CTA renal and take Benadryl 50mg with her for PRN use, patient verbalized understanding and does not wish for Benadryl to be sent to pharmacy, states \"I have 50mg Benadryl at home I will take with me\".  "

## 2020-02-28 NOTE — TELEPHONE ENCOUNTER
Script for premedication of Prednisone 50mg 13 hours prior, 7 hours prior and 1 hour prior to CTA renal sent to pharmacy.

## 2020-03-28 ENCOUNTER — RESULTS ENCOUNTER (OUTPATIENT)
Dept: CARDIOLOGY | Facility: CLINIC | Age: 65
End: 2020-03-28

## 2020-03-28 DIAGNOSIS — I10 ESSENTIAL HYPERTENSION: ICD-10-CM

## 2020-03-28 DIAGNOSIS — I70.1 RENAL ARTERY STENOSIS (HCC): ICD-10-CM

## 2020-08-11 ENCOUNTER — TELEPHONE (OUTPATIENT)
Dept: CARDIOLOGY | Facility: CLINIC | Age: 65
End: 2020-08-11

## 2020-08-11 ENCOUNTER — OFFICE VISIT (OUTPATIENT)
Dept: CARDIOLOGY | Facility: CLINIC | Age: 65
End: 2020-08-11

## 2020-08-11 VITALS
BODY MASS INDEX: 31.95 KG/M2 | DIASTOLIC BLOOD PRESSURE: 90 MMHG | WEIGHT: 152.2 LBS | TEMPERATURE: 97.7 F | HEIGHT: 58 IN | HEART RATE: 60 BPM | SYSTOLIC BLOOD PRESSURE: 180 MMHG

## 2020-08-11 DIAGNOSIS — E78.00 PURE HYPERCHOLESTEROLEMIA: Primary | ICD-10-CM

## 2020-08-11 DIAGNOSIS — I10 ESSENTIAL HYPERTENSION: ICD-10-CM

## 2020-08-11 DIAGNOSIS — M19.90 ARTHRITIS: ICD-10-CM

## 2020-08-11 DIAGNOSIS — E11.9 TYPE 2 DIABETES MELLITUS WITHOUT COMPLICATION, WITHOUT LONG-TERM CURRENT USE OF INSULIN (HCC): ICD-10-CM

## 2020-08-11 PROCEDURE — 99213 OFFICE O/P EST LOW 20 MIN: CPT | Performed by: NURSE PRACTITIONER

## 2020-08-11 RX ORDER — HYDROXYCHLOROQUINE SULFATE 200 MG/1
TABLET, FILM COATED ORAL 2 TIMES DAILY
COMMUNITY

## 2020-08-11 RX ORDER — MINOXIDIL 10 MG/1
TABLET ORAL
Qty: 180 TABLET | Refills: 3
Start: 2020-08-11 | End: 2020-08-26 | Stop reason: ALTCHOICE

## 2020-08-11 NOTE — PATIENT INSTRUCTIONS
"DASH Eating Plan  DASH stands for \"Dietary Approaches to Stop Hypertension.\" The DASH eating plan is a healthy eating plan that has been shown to reduce high blood pressure (hypertension). It may also reduce your risk for type 2 diabetes, heart disease, and stroke. The DASH eating plan may also help with weight loss.  What are tips for following this plan?    General guidelines  · Avoid eating more than 2,300 mg (milligrams) of salt (sodium) a day. If you have hypertension, you may need to reduce your sodium intake to 1,500 mg a day.  · Limit alcohol intake to no more than 1 drink a day for nonpregnant women and 2 drinks a day for men. One drink equals 12 oz of beer, 5 oz of wine, or 1½ oz of hard liquor.  · Work with your health care provider to maintain a healthy body weight or to lose weight. Ask what an ideal weight is for you.  · Get at least 30 minutes of exercise that causes your heart to beat faster (aerobic exercise) most days of the week. Activities may include walking, swimming, or biking.  · Work with your health care provider or diet and nutrition specialist (dietitian) to adjust your eating plan to your individual calorie needs.  Reading food labels    · Check food labels for the amount of sodium per serving. Choose foods with less than 5 percent of the Daily Value of sodium. Generally, foods with less than 300 mg of sodium per serving fit into this eating plan.  · To find whole grains, look for the word \"whole\" as the first word in the ingredient list.  Shopping  · Buy products labeled as \"low-sodium\" or \"no salt added.\"  · Buy fresh foods. Avoid canned foods and premade or frozen meals.  Cooking  · Avoid adding salt when cooking. Use salt-free seasonings or herbs instead of table salt or sea salt. Check with your health care provider or pharmacist before using salt substitutes.  · Do not chauhan foods. Cook foods using healthy methods such as baking, boiling, grilling, and broiling instead.  · Cook with " heart-healthy oils, such as olive, canola, soybean, or sunflower oil.  Meal planning  · Eat a balanced diet that includes:  ? 5 or more servings of fruits and vegetables each day. At each meal, try to fill half of your plate with fruits and vegetables.  ? Up to 6-8 servings of whole grains each day.  ? Less than 6 oz of lean meat, poultry, or fish each day. A 3-oz serving of meat is about the same size as a deck of cards. One egg equals 1 oz.  ? 2 servings of low-fat dairy each day.  ? A serving of nuts, seeds, or beans 5 times each week.  ? Heart-healthy fats. Healthy fats called Omega-3 fatty acids are found in foods such as flaxseeds and coldwater fish, like sardines, salmon, and mackerel.  · Limit how much you eat of the following:  ? Canned or prepackaged foods.  ? Food that is high in trans fat, such as fried foods.  ? Food that is high in saturated fat, such as fatty meat.  ? Sweets, desserts, sugary drinks, and other foods with added sugar.  ? Full-fat dairy products.  · Do not salt foods before eating.  · Try to eat at least 2 vegetarian meals each week.  · Eat more home-cooked food and less restaurant, buffet, and fast food.  · When eating at a restaurant, ask that your food be prepared with less salt or no salt, if possible.  What foods are recommended?  The items listed may not be a complete list. Talk with your dietitian about what dietary choices are best for you.  Grains  Whole-grain or whole-wheat bread. Whole-grain or whole-wheat pasta. Brown rice. Oatmeal. Quinoa. Bulgur. Whole-grain and low-sodium cereals. Marlene bread. Low-fat, low-sodium crackers. Whole-wheat flour tortillas.  Vegetables  Fresh or frozen vegetables (raw, steamed, roasted, or grilled). Low-sodium or reduced-sodium tomato and vegetable juice. Low-sodium or reduced-sodium tomato sauce and tomato paste. Low-sodium or reduced-sodium canned vegetables.  Fruits  All fresh, dried, or frozen fruit. Canned fruit in natural juice (without  added sugar).  Meat and other protein foods  Skinless chicken or turkey. Ground chicken or turkey. Pork with fat trimmed off. Fish and seafood. Egg whites. Dried beans, peas, or lentils. Unsalted nuts, nut butters, and seeds. Unsalted canned beans. Lean cuts of beef with fat trimmed off. Low-sodium, lean deli meat.  Dairy  Low-fat (1%) or fat-free (skim) milk. Fat-free, low-fat, or reduced-fat cheeses. Nonfat, low-sodium ricotta or cottage cheese. Low-fat or nonfat yogurt. Low-fat, low-sodium cheese.  Fats and oils  Soft margarine without trans fats. Vegetable oil. Low-fat, reduced-fat, or light mayonnaise and salad dressings (reduced-sodium). Canola, safflower, olive, soybean, and sunflower oils. Avocado.  Seasoning and other foods  Herbs. Spices. Seasoning mixes without salt. Unsalted popcorn and pretzels. Fat-free sweets.  What foods are not recommended?  The items listed may not be a complete list. Talk with your dietitian about what dietary choices are best for you.  Grains  Baked goods made with fat, such as croissants, muffins, or some breads. Dry pasta or rice meal packs.  Vegetables  Creamed or fried vegetables. Vegetables in a cheese sauce. Regular canned vegetables (not low-sodium or reduced-sodium). Regular canned tomato sauce and paste (not low-sodium or reduced-sodium). Regular tomato and vegetable juice (not low-sodium or reduced-sodium). Pickles. Olives.  Fruits  Canned fruit in a light or heavy syrup. Fried fruit. Fruit in cream or butter sauce.  Meat and other protein foods  Fatty cuts of meat. Ribs. Fried meat. Spears. Sausage. Bologna and other processed lunch meats. Salami. Fatback. Hotdogs. Bratwurst. Salted nuts and seeds. Canned beans with added salt. Canned or smoked fish. Whole eggs or egg yolks. Chicken or turkey with skin.  Dairy  Whole or 2% milk, cream, and half-and-half. Whole or full-fat cream cheese. Whole-fat or sweetened yogurt. Full-fat cheese. Nondairy creamers. Whipped toppings.  Processed cheese and cheese spreads.  Fats and oils  Butter. Stick margarine. Lard. Shortening. Ghee. Spears fat. Tropical oils, such as coconut, palm kernel, or palm oil.  Seasoning and other foods  Salted popcorn and pretzels. Onion salt, garlic salt, seasoned salt, table salt, and sea salt. Worcestershire sauce. Tartar sauce. Barbecue sauce. Teriyaki sauce. Soy sauce, including reduced-sodium. Steak sauce. Canned and packaged gravies. Fish sauce. Oyster sauce. Cocktail sauce. Horseradish that you find on the shelf. Ketchup. Mustard. Meat flavorings and tenderizers. Bouillon cubes. Hot sauce and Tabasco sauce. Premade or packaged marinades. Premade or packaged taco seasonings. Relishes. Regular salad dressings.  Where to find more information:  · National Heart, [employer], and [employer] [place]: www.nhlbi.nih.gov  · American Heart Association: www.heart.org  Summary  · The DASH eating plan is a healthy eating plan that has been shown to reduce high blood pressure (hypertension). It may also reduce your risk for type 2 diabetes, heart disease, and stroke.  · With the DASH eating plan, you should limit salt (sodium) intake to 2,300 mg a day. If you have hypertension, you may need to reduce your sodium intake to 1,500 mg a day.  · When on the DASH eating plan, aim to eat more fresh fruits and vegetables, whole grains, lean proteins, low-fat dairy, and heart-healthy fats.  · Work with your health care provider or diet and nutrition specialist (dietitian) to adjust your eating plan to your individual calorie needs.  This information is not intended to replace advice given to you by your health care provider. Make sure you discuss any questions you have with your health care provider.  Document Released: 12/06/2012 Document Revised: 11/30/2018 Document Reviewed: 12/11/2017  Elsevier Patient Education © 2020 [employer]

## 2020-08-11 NOTE — PROGRESS NOTES
Chief Complaint   Patient presents with   • Follow-up     Cardiac management.   • Lab     Last labs in chart. Does not need refills at this time.   • Palpitations     Has occasional, not often.   • Chest Pain     Having occasional dull pains in upper mid chest that radiates to neck, she reports having gastritis in the past and hiatal hernia.    • Blood pressure     Has been elevated at home.     Jodie Harry is a 64 y.o. female with difficult to control blood pressure, hyperlipidemia, diabetes and hypothyroidism.  Stress test to have been negative for ischemia with the last in March 2019.  She has been treated with multiple antihypertensives.  Last visit, renal artery ultrasound was ordered followed by CTA and showed no stenosis.  Minoxidil was titrated up and blood pressure became better controlled.     She returns today for follow-up.  Initially blood pressure was well controlled then she decreased her minoxidil back to once daily then blood pressure started to increase.  She has mild epigastric tenderness but no anginal symptoms.  Labs on 6/15/2020 showed A1C 6.2%, TSH 8.28, CBC normal, , HDL 59, , , normal renal function and electrolytes.           Cardiac History:    Past Surgical History:   Procedure Laterality Date   • CARDIOVASCULAR STRESS TEST  04/24/2002    7 min, 86% THR, Neg   • CARDIOVASCULAR STRESS TEST  03/23/2010    4 min, 30 sec 88% THR. /88. Neg   • CARDIOVASCULAR STRESS TEST  07/20/2012    L. Myoview- Negative   • CARDIOVASCULAR STRESS TEST  01/28/2016    R.Stress- 7 Min, 10.1 METS. 91% THR. /104. Negative   • CARDIOVASCULAR STRESS TEST  03/05/2019    8 Min. 10.1 METS. 87% THR. BP- 170/85. Negative.   • CONVERTED (HISTORICAL) HOLTER  03/30/2004    AVG HR 68BPM   • ECHO - CONVERTED  09/17/2007    EF >60% RVSP 42 mmHg   • ECHO - CONVERTED  11/17/2008    EF >60%. RVSP 37 mmHg   • ECHO - CONVERTED  07/08/2010    EF 65% RVSP- 50 mmHg   • ECHO -  CONVERTED  10/17/2011    EF >60% RVSP-43 mmHg   • ECHO - CONVERTED  03/13/2014    EF 65% RVSP- 26mmHg   • ECHO - CONVERTED  01/12/2016    EF 65%   • ECHO - CONVERTED  03/05/2019    EF 65%. Mild MR. RVSP 31 mmHg   • OTHER SURGICAL HISTORY  02/27/2020    CTA renal arteries-no stenosis   • US CAROTID UNILATERAL  03/20/2014    Normal       Current Outpatient Medications   Medication Sig Dispense Refill   • aspirin  MG tablet Take 325 mg by mouth daily.     • carvedilol (COREG) 3.125 MG tablet Take 1 tablet by mouth 2 (Two) Times a Day. 180 tablet 3   • cloNIDine (CATAPRES) 0.2 MG tablet Take 0.2 mg by mouth 3 (Three) Times a Day.     • digoxin (LANOXIN) 125 MCG tablet Take 1 tablet by mouth Daily. 90 tablet 3   • furosemide (LASIX) 40 MG tablet Take 40 mg by mouth 2 (Two) Times a Day As Needed.     • gabapentin (NEURONTIN) 800 MG tablet Take 800 mg by mouth 3 (three) times a day.     • glipiZIDE (GLUCOTROL) 2.5 MG 24 hr tablet Take 2.5 mg by mouth 2 (Two) Times a Day.     • hydroxychloroquine (PLAQUENIL) 200 MG tablet Take  by mouth 2 (Two) Times a Day.     • levothyroxine (SYNTHROID, LEVOTHROID) 125 MCG tablet Take 125 mcg by mouth Daily.     • meloxicam (MOBIC) 15 MG tablet Take 15 mg by mouth daily.     • metFORMIN (GLUCOPHAGE) 500 MG tablet Take 500 mg by mouth 2 (two) times a day with meals.     • minoxidil (LONITEN) 10 MG tablet One tablet twice daily 180 tablet 3   • NIFEdipine XL (PROCARDIA XL) 90 MG 24 hr tablet Take 90 mg by mouth daily.     • pravastatin (PRAVACHOL) 80 MG tablet Take 80 mg by mouth daily.     • valsartan-hydrochlorothiazide (DIOVAN-HCT) 320-25 MG per tablet Take 1 tablet by mouth Daily.       No current facility-administered medications for this visit.      Codeine; Iodine; Morphine and related; and Other    Past Medical History:   Diagnosis Date   • Aortic insufficiency    • Diabetes mellitus (CMS/HCC)    • Hiatal hernia    • Hyperlipidemia    • Hypertension    • Palpitations    •  "Restless legs syndrome (RLS)     followed by Dr Atkins     Social History     Socioeconomic History   • Marital status:      Spouse name: Not on file   • Number of children: Not on file   • Years of education: Not on file   • Highest education level: Not on file   Tobacco Use   • Smoking status: Never Smoker   • Smokeless tobacco: Never Used   Substance and Sexual Activity   • Alcohol use: No   • Drug use: No     Family History   Problem Relation Age of Onset   • Diabetes Mother    • Heart disease Mother      Review of Systems   Constitution: Positive for weight gain (Up 2 lb). Negative for decreased appetite and malaise/fatigue.   HENT: Negative.    Eyes: Negative for blurred vision.   Cardiovascular: Positive for leg swelling. Negative for chest pain, dyspnea on exertion, palpitations and syncope.   Respiratory: Negative for shortness of breath and sleep disturbances due to breathing.    Endocrine: Negative.    Hematologic/Lymphatic: Negative for bleeding problem. Does not bruise/bleed easily.   Skin: Negative.    Musculoskeletal: Negative for falls and myalgias.   Gastrointestinal: Negative for abdominal pain, heartburn and melena.   Genitourinary: Negative for hematuria.   Neurological: Negative for dizziness and light-headedness.   Psychiatric/Behavioral: Negative for altered mental status.   Allergic/Immunologic: Negative.       Objective     /90 (BP Location: Left arm)   Pulse 60   Temp 97.7 °F (36.5 °C)   Ht 147.3 cm (57.99\")   Wt 69 kg (152 lb 3.2 oz)   BMI 31.82 kg/m²     Physical Exam   Constitutional: She is oriented to person, place, and time. She appears well-developed and well-nourished. No distress.   HENT:   Head: Normocephalic.   Eyes: Pupils are equal, round, and reactive to light.   Neck: Normal range of motion.   Cardiovascular: Normal rate, regular rhythm, S1 normal, S2 normal and intact distal pulses.   Murmur heard.   Systolic murmur is present with a grade of 1/6 at the " apex.  Pulmonary/Chest: Effort normal and breath sounds normal. No respiratory distress.   Abdominal: Soft. Bowel sounds are normal.   Musculoskeletal: Normal range of motion. She exhibits edema (Right foot).   Neurological: She is alert and oriented to person, place, and time.   Skin: Skin is warm and dry. She is not diaphoretic.   Psychiatric: She has a normal mood and affect.   Nursing note and vitals reviewed.     Procedures          Problem List Items Addressed This Visit        Cardiovascular and Mediastinum    Essential hypertension    Relevant Medications    minoxidil (LONITEN) 10 MG tablet    Pure hypercholesterolemia - Primary       Endocrine    Type 2 diabetes mellitus without complication, without long-term current use of insulin (CMS/[facility])       Musculoskeletal and Integument    Arthritis        1.  HTN- remains elevated today at 180/90.  She is strongly advised to increase minoxidil back to 10 mg twice daily.  Continue valsartan/HCTZ, Procardia, clonidine and Coreg.  If blood pressure does not improve, we will consider discontinuing digoxin and increase carvedilol to maximum dose as heart rate responds.  Renal artery stenosis has been ruled out.  She may need to sleep apnea work-up if not done in the past.    2.  Hyperlipidemia-she is on pravastatin 80 mg with LDL slightly above goal at 128 with normal HDL.  Strict low-cholesterol diet encouraged.    3.  Diabetes-well controlled with oral antidiabetic agents.    She is advised to monitor blood pressure for the next week and report readings.  We will see her back in 6 months.  Patient's Body mass index is 31.82 kg/m². BMI is above normal parameters. Recommendations include: nutrition counseling.                 Electronically signed by CHARLES Mijares,  August 14, 2020 11:24

## 2020-08-26 ENCOUNTER — TELEPHONE (OUTPATIENT)
Dept: CARDIOLOGY | Facility: CLINIC | Age: 65
End: 2020-08-26

## 2020-08-26 DIAGNOSIS — I10 UNCONTROLLED HYPERTENSION: Primary | ICD-10-CM

## 2020-08-26 DIAGNOSIS — G47.39 OTHER SLEEP APNEA: ICD-10-CM

## 2020-08-26 RX ORDER — HYDRALAZINE HYDROCHLORIDE 25 MG/1
25 TABLET, FILM COATED ORAL 3 TIMES DAILY
Qty: 90 TABLET | Refills: 0 | Status: SHIPPED | OUTPATIENT
Start: 2020-08-26 | End: 2020-08-27 | Stop reason: SDUPTHER

## 2020-08-27 ENCOUNTER — TELEPHONE (OUTPATIENT)
Dept: CARDIOLOGY | Facility: CLINIC | Age: 65
End: 2020-08-27

## 2020-08-27 RX ORDER — HYDRALAZINE HYDROCHLORIDE 25 MG/1
25 TABLET, FILM COATED ORAL 3 TIMES DAILY
Qty: 90 TABLET | Refills: 0 | Status: SHIPPED | OUTPATIENT
Start: 2020-08-27 | End: 2020-09-22

## 2020-08-27 NOTE — TELEPHONE ENCOUNTER
Patient has now requested script for Hydralazine 25mg tid to be sent to local pharmacy. 30 day script for hydralazine 25mg tid sent to [facility] pharmacy.

## 2020-08-27 NOTE — TELEPHONE ENCOUNTER
Add hydralazine 25 mg TID    Stop minoxidil. Not helping.     Return for bp check in 1 week or call with readings.     Has she been evaluated for sleep apnea?   
Let us reevaluate for sleep apnea.  Overnight pulse ox.  
Order cancelled.  
Patient called back concerning B/P readings. Patient reports the reason she did not call last week was due to  in hospital related to heart attack. B/P has been 180/90 to 200/130 with HR 57-80. She feels could be related to stress.    Medications  Aspirin 325mg daily  Coreg 3.125mg bid  Clonidine 0.5mg TID  Digoxin 125mcg daily  Lasix 40mg bid prn  minoxidil 10mg bid  Nifedipine 90mg daily  Valsartan-HCTZ 320-25mg daily  
Patient called back this am and does not wish to have overnight pulse ox at this time, will call office back if she decides to have overnight pulse ox. Is it ok to cancel in order in workqueue?  
Patient made aware of recommendation for overnight pulse ox, patient verbalized understanding.  
Patient made aware of recommendations to add Hydralazine 25mg TID, stop minoxidil, return for B/P check in 1 week or call with reading. Patient request to call with B/P readings in one week, she was evaluated for sleep apnea years ago with normal results.  
Yes   
97.12

## 2020-09-22 RX ORDER — HYDRALAZINE HYDROCHLORIDE 25 MG/1
TABLET, FILM COATED ORAL
Qty: 90 TABLET | Refills: 0 | Status: SHIPPED | OUTPATIENT
Start: 2020-09-22 | End: 2020-10-14

## 2020-10-15 RX ORDER — HYDRALAZINE HYDROCHLORIDE 25 MG/1
TABLET, FILM COATED ORAL
Qty: 270 TABLET | Refills: 3 | Status: SHIPPED | OUTPATIENT
Start: 2020-10-15 | End: 2021-08-10 | Stop reason: SDUPTHER

## 2020-12-02 DIAGNOSIS — R00.2 PALPITATIONS: ICD-10-CM

## 2020-12-02 DIAGNOSIS — I10 ESSENTIAL HYPERTENSION: ICD-10-CM

## 2020-12-10 RX ORDER — CARVEDILOL 3.12 MG/1
TABLET ORAL
Qty: 180 TABLET | Refills: 3 | Status: SHIPPED | OUTPATIENT
Start: 2020-12-10 | End: 2021-08-10 | Stop reason: DRUGHIGH

## 2020-12-10 RX ORDER — DIGOXIN 125 MCG
TABLET ORAL
Qty: 90 TABLET | Refills: 3 | Status: SHIPPED | OUTPATIENT
Start: 2020-12-10 | End: 2021-08-10 | Stop reason: HOSPADM

## 2021-07-07 RX ORDER — HYDRALAZINE HYDROCHLORIDE 25 MG/1
TABLET, FILM COATED ORAL
Qty: 270 TABLET | Refills: 3 | OUTPATIENT
Start: 2021-07-07

## 2021-08-10 ENCOUNTER — OFFICE VISIT (OUTPATIENT)
Dept: CARDIOLOGY | Facility: CLINIC | Age: 66
End: 2021-08-10

## 2021-08-10 VITALS
HEIGHT: 58 IN | BODY MASS INDEX: 32.37 KG/M2 | DIASTOLIC BLOOD PRESSURE: 90 MMHG | SYSTOLIC BLOOD PRESSURE: 150 MMHG | WEIGHT: 154.2 LBS | HEART RATE: 68 BPM

## 2021-08-10 DIAGNOSIS — E66.9 OBESITY (BMI 30.0-34.9): ICD-10-CM

## 2021-08-10 DIAGNOSIS — I10 ESSENTIAL HYPERTENSION: ICD-10-CM

## 2021-08-10 DIAGNOSIS — R60.0 EDEMA LEG: ICD-10-CM

## 2021-08-10 DIAGNOSIS — R00.2 PALPITATIONS: ICD-10-CM

## 2021-08-10 DIAGNOSIS — E03.9 ACQUIRED HYPOTHYROIDISM: ICD-10-CM

## 2021-08-10 DIAGNOSIS — E11.9 TYPE 2 DIABETES MELLITUS WITHOUT COMPLICATION, WITHOUT LONG-TERM CURRENT USE OF INSULIN (HCC): ICD-10-CM

## 2021-08-10 DIAGNOSIS — E78.00 PURE HYPERCHOLESTEROLEMIA: Primary | ICD-10-CM

## 2021-08-10 DIAGNOSIS — M19.90 ARTHRITIS: ICD-10-CM

## 2021-08-10 PROCEDURE — 99214 OFFICE O/P EST MOD 30 MIN: CPT | Performed by: NURSE PRACTITIONER

## 2021-08-10 RX ORDER — PRAVASTATIN SODIUM 80 MG/1
80 TABLET ORAL DAILY
Qty: 90 TABLET | Refills: 2 | Status: SHIPPED | OUTPATIENT
Start: 2021-08-10 | End: 2022-08-11 | Stop reason: ALTCHOICE

## 2021-08-10 RX ORDER — FUROSEMIDE 40 MG/1
40 TABLET ORAL 2 TIMES DAILY PRN
Qty: 180 TABLET | Refills: 2 | Status: SHIPPED | OUTPATIENT
Start: 2021-08-10 | End: 2022-03-15

## 2021-08-10 RX ORDER — NIFEDIPINE 90 MG/1
90 TABLET, EXTENDED RELEASE ORAL DAILY
Qty: 90 TABLET | Refills: 2 | Status: SHIPPED | OUTPATIENT
Start: 2021-08-10 | End: 2022-05-11 | Stop reason: SDUPTHER

## 2021-08-10 RX ORDER — CARVEDILOL 6.25 MG/1
6.25 TABLET ORAL 2 TIMES DAILY
Qty: 180 TABLET | Refills: 3 | Status: SHIPPED | OUTPATIENT
Start: 2021-08-10 | End: 2022-05-25

## 2021-08-10 RX ORDER — VALSARTAN AND HYDROCHLOROTHIAZIDE 320; 25 MG/1; MG/1
1 TABLET, FILM COATED ORAL DAILY
Qty: 90 TABLET | Refills: 2 | Status: SHIPPED | OUTPATIENT
Start: 2021-08-10

## 2021-08-10 RX ORDER — CLONIDINE HYDROCHLORIDE 0.2 MG/1
0.2 TABLET ORAL 3 TIMES DAILY
Qty: 270 TABLET | Refills: 2 | Status: SHIPPED | OUTPATIENT
Start: 2021-08-10 | End: 2022-03-15

## 2021-08-10 RX ORDER — HYDRALAZINE HYDROCHLORIDE 25 MG/1
25 TABLET, FILM COATED ORAL 3 TIMES DAILY
Qty: 270 TABLET | Refills: 3 | Status: SHIPPED | OUTPATIENT
Start: 2021-08-10 | End: 2022-05-25

## 2021-08-10 NOTE — PROGRESS NOTES
Chief Complaint   Patient presents with   • Follow-up     for cardiac management, complains of heart fluttering off and on, denies any chest pain. Swelling of BLE.    • labs     last labs done in June, by Dr. Mandel.    • Med Refill     requesting refills on cardiac medications, 90 day supply.    • Hypertension     complains of blood pressure being elevated.        Cardiac Complaints  lower extremity edema and palpitations      Subjective   Ana Harry is a 65 y.o. female Ana Harry is a 64 y.o. female with difficult to control blood pressure, hyperlipidemia, palpitations, diabetes and hypothyroidism.  Stress test have been negative for ischemia with the last in March 2019.  She has been treated with multiple antihypertensives.  Last visit, renal artery ultrasound was ordered followed by CTA and showed no stenosis.  Minoxidil was titrated up and blood pressure became better controlled.     She comes today for follow up and denies chest discomfort, dizziness, and syncope. She does have some issues with some rare fluttering on and off.  Mild edema of right ankle, she reports this is from prior injury. BP remains elevated at home despite medications.  Labs remain followed by PCP and checked in June, she states her triglycerides and LDL slightly high, no adjustment in meds made. Refills requested.         Cardiac History  Past Surgical History:   Procedure Laterality Date   • CARDIOVASCULAR STRESS TEST  04/24/2002    7 min, 86% THR, Neg   • CARDIOVASCULAR STRESS TEST  03/23/2010    4 min, 30 sec 88% THR. /88. Neg   • CARDIOVASCULAR STRESS TEST  07/20/2012    L. Myoview- Negative   • CARDIOVASCULAR STRESS TEST  01/28/2016    R.Stress- 7 Min, 10.1 METS. 91% THR. /104. Negative   • CARDIOVASCULAR STRESS TEST  03/05/2019    8 Min. 10.1 METS. 87% THR. BP- 170/85. Negative.   • CONVERTED (HISTORICAL) HOLTER  03/30/2004    AVG HR 68BPM   • ECHO - CONVERTED  09/17/2007    EF >60% RVSP 42 mmHg   • ECHO -  CONVERTED  11/17/2008    EF >60%. RVSP 37 mmHg   • ECHO - CONVERTED  07/08/2010    EF 65% RVSP- 50 mmHg   • ECHO - CONVERTED  10/17/2011    EF >60% RVSP-43 mmHg   • ECHO - CONVERTED  03/13/2014    EF 65% RVSP- 26mmHg   • ECHO - CONVERTED  01/12/2016    EF 65%   • ECHO - CONVERTED  03/05/2019    EF 65%. Mild MR. RVSP 31 mmHg   • OTHER SURGICAL HISTORY  02/27/2020    CTA renal arteries-no stenosis   • US CAROTID UNILATERAL  03/20/2014    Normal       Current Outpatient Medications   Medication Sig Dispense Refill   • aspirin  MG tablet Take 325 mg by mouth daily.     • cloNIDine (CATAPRES) 0.2 MG tablet Take 1 tablet by mouth 3 (Three) Times a Day. 270 tablet 2   • furosemide (LASIX) 40 MG tablet Take 1 tablet by mouth 2 (Two) Times a Day As Needed (as needed for edema). 180 tablet 2   • gabapentin (NEURONTIN) 800 MG tablet Take 800 mg by mouth 3 (three) times a day.     • glipiZIDE (GLUCOTROL) 2.5 MG 24 hr tablet Take 2.5 mg by mouth 2 (Two) Times a Day.     • hydrALAZINE (APRESOLINE) 25 MG tablet Take 1 tablet by mouth 3 (Three) Times a Day. 270 tablet 3   • hydroxychloroquine (PLAQUENIL) 200 MG tablet Take  by mouth 2 (Two) Times a Day.     • levothyroxine (SYNTHROID, LEVOTHROID) 125 MCG tablet Take 125 mcg by mouth Daily.     • meloxicam (MOBIC) 15 MG tablet Take 15 mg by mouth daily.     • metFORMIN (GLUCOPHAGE) 500 MG tablet Take 500 mg by mouth 2 (two) times a day with meals.     • NIFEdipine XL (PROCARDIA XL) 90 MG 24 hr tablet Take 1 tablet by mouth Daily. 90 tablet 2   • pravastatin (PRAVACHOL) 80 MG tablet Take 1 tablet by mouth Daily. 90 tablet 2   • valsartan-hydrochlorothiazide (DIOVAN-HCT) 320-25 MG per tablet Take 1 tablet by mouth Daily. 90 tablet 2   • carvedilol (COREG) 6.25 MG tablet Take 1 tablet by mouth 2 (Two) Times a Day. 180 tablet 3     No current facility-administered medications for this visit.       Codeine, Iodine, Morphine and related, and Other    Past Medical History:  "  Diagnosis Date   • Aortic insufficiency    • Diabetes mellitus (CMS/HCC)    • Hiatal hernia    • Hyperlipidemia    • Hypertension    • Palpitations    • Restless legs syndrome (RLS)     followed by Dr Atkins       Social History     Socioeconomic History   • Marital status:      Spouse name: Not on file   • Number of children: Not on file   • Years of education: Not on file   • Highest education level: Not on file   Tobacco Use   • Smoking status: Never Smoker   • Smokeless tobacco: Never Used   Vaping Use   • Vaping Use: Never used   Substance and Sexual Activity   • Alcohol use: No   • Drug use: No       Family History   Problem Relation Age of Onset   • Diabetes Mother    • Heart disease Mother        Review of Systems   Constitutional: Positive for malaise/fatigue. Negative for night sweats.   Cardiovascular: Positive for leg swelling and palpitations. Negative for chest pain, claudication, dyspnea on exertion, irregular heartbeat, near-syncope, orthopnea and syncope.   Respiratory: Negative for cough, shortness of breath and wheezing.    Musculoskeletal: Positive for stiffness. Negative for back pain, joint pain and joint swelling.   Gastrointestinal: Negative for anorexia, flatus, melena, nausea and vomiting.   Genitourinary: Negative for dysuria, hematuria, hesitancy and nocturia.   Neurological: Negative for dizziness, light-headedness and loss of balance.   Psychiatric/Behavioral: Negative for depression and memory loss. The patient is not nervous/anxious.            Objective     /90 (BP Location: Left arm, Patient Position: Sitting)   Pulse 68   Ht 147.3 cm (57.99\")   Wt 69.9 kg (154 lb 3.2 oz)   BMI 32.24 kg/m²     Constitutional:       Appearance: Not in distress.   Eyes:      Pupils: Pupils are equal, round, and reactive to light.   HENT:      Nose: Nose normal.   Pulmonary:      Effort: Pulmonary effort is normal.      Breath sounds: Normal breath sounds.   Cardiovascular:      " PMI at left midclavicular line. Normal rate. Regular rhythm.      Murmurs: There is a systolic murmur.   Edema:     Peripheral edema present.  Abdominal:      Palpations: Abdomen is soft.   Musculoskeletal: Normal range of motion.      Cervical back: Normal range of motion and neck supple. Skin:     General: Skin is warm and dry.   Neurological:      Mental Status: Oriented to person, place and time.         Procedures    Assessment/Plan     HTN:  Blood pressure elevated. Patient no longer on minoxidil therapy.  She is now on apresoline.  BP remains high. We will advise to increase coreg therapy to 6.25mg BID. Same procardia, catapres, and diovan HCT continued. Limited sodium advised. BP log encouraged.     Hyperlipidemia:  On statin therapy with pravachol. Patient tolerates well. FLP recently done with PCP, no current available for review. Can we have most recent copy?  She admits TRIGS were high, patient counseled to limit sugar/starch in her diet.    Edema:  Noted to right ankle, she states similar to prior. Lasix therapy continued at the same. Limited sodium advised.     Palpitations:  Denied, digoxin will be discontinued. Patient to increase coreg.  HR check advised at home with the change, if elevation noted, patient will add digoxin back.     DM:  AIC followed by PCP. Patient will continue with current glucophage therapy.  Limited sugar/carbs advised.     Cardiac status:  Stable. No new concerns voiced. She will continue with ASA therapy.  No workup recommended today but will consider at next visit.         Problems Addressed this Visit        Cardiac and Vasculature    Essential hypertension    Relevant Medications    carvedilol (COREG) 6.25 MG tablet    furosemide (LASIX) 40 MG tablet    hydrALAZINE (APRESOLINE) 25 MG tablet    NIFEdipine XL (PROCARDIA XL) 90 MG 24 hr tablet    valsartan-hydrochlorothiazide (DIOVAN-HCT) 320-25 MG per tablet    cloNIDine (CATAPRES) 0.2 MG tablet    Pure hypercholesterolemia  - Primary    Relevant Medications    pravastatin (PRAVACHOL) 80 MG tablet       Endocrine and Metabolic    Type 2 diabetes mellitus without complication, without long-term current use of insulin (CMS/HCC)    Acquired hypothyroidism    Relevant Medications    carvedilol (COREG) 6.25 MG tablet       Musculoskeletal and Injuries    Arthritis      Other Visit Diagnoses     Palpitations        Edema leg        Obesity (BMI 30.0-34.9)          Diagnoses       Codes Comments    Pure hypercholesterolemia    -  Primary ICD-10-CM: E78.00  ICD-9-CM: 272.0     Essential hypertension     ICD-10-CM: I10  ICD-9-CM: 401.9     Palpitations     ICD-10-CM: R00.2  ICD-9-CM: 785.1     Arthritis     ICD-10-CM: M19.90  ICD-9-CM: 716.90     Type 2 diabetes mellitus without complication, without long-term current use of insulin (CMS/[facility])     ICD-10-CM: E11.9  ICD-9-CM: 250.00     Acquired hypothyroidism     ICD-10-CM: E03.9  ICD-9-CM: 244.9     Edema leg     ICD-10-CM: R60.0  ICD-9-CM: 782.3     Obesity (BMI 30.0-34.9)     ICD-10-CM: E66.9  ICD-9-CM: 278.00           Patient's Body mass index is 32.24 kg/m². indicating that she is obese. Good cardiac ADA diet with limited carbs, calories, and sodium intake advised.              Electronically signed by CHARLES Harden August 10, 2021 15:59 EDT

## 2021-09-23 ENCOUNTER — TELEPHONE (OUTPATIENT)
Dept: CARDIOLOGY | Facility: CLINIC | Age: 66
End: 2021-09-23

## 2021-09-23 DIAGNOSIS — R01.1 HEART MURMUR: ICD-10-CM

## 2021-09-23 DIAGNOSIS — I63.89 CEREBROVASCULAR ACCIDENT (CVA) DUE TO OTHER MECHANISM (HCC): Primary | ICD-10-CM

## 2021-09-23 NOTE — TELEPHONE ENCOUNTER
Patient called stating that she had a stroke and went to [facility] on Sunday. She states that they have set her up to see a neurologist on the 30th and they want her to have an echo done before then. Can you order? I have records from  to be scanned in.

## 2021-09-27 ENCOUNTER — HOSPITAL ENCOUNTER (OUTPATIENT)
Dept: CARDIOLOGY | Facility: HOSPITAL | Age: 66
Discharge: HOME | End: 2021-09-27
Admitting: NURSE PRACTITIONER

## 2021-09-27 DIAGNOSIS — R01.1 HEART MURMUR: ICD-10-CM

## 2021-09-27 DIAGNOSIS — I63.89 CEREBROVASCULAR ACCIDENT (CVA) DUE TO OTHER MECHANISM (HCC): ICD-10-CM

## 2021-09-27 LAB
BH CV ECHO MEAS - ACS: 2 CM
BH CV ECHO MEAS - AO MAX PG: 3.7 MMHG
BH CV ECHO MEAS - AO MEAN PG: 2 MMHG
BH CV ECHO MEAS - AO ROOT AREA (BSA CORRECTED): 1.9
BH CV ECHO MEAS - AO ROOT AREA: 7.5 CM^2
BH CV ECHO MEAS - AO ROOT DIAM: 3.1 CM
BH CV ECHO MEAS - AO V2 MAX: 96.1 CM/SEC
BH CV ECHO MEAS - AO V2 MEAN: 68.6 CM/SEC
BH CV ECHO MEAS - AO V2 VTI: 16 CM
BH CV ECHO MEAS - BSA(HAYCOCK): 1.7 M^2
BH CV ECHO MEAS - BSA: 1.6 M^2
BH CV ECHO MEAS - BZI_BMI: 33.3 KILOGRAMS/M^2
BH CV ECHO MEAS - BZI_METRIC_HEIGHT: 144.8 CM
BH CV ECHO MEAS - BZI_METRIC_WEIGHT: 69.9 KG
BH CV ECHO MEAS - EDV(CUBED): 10.5 ML
BH CV ECHO MEAS - EDV(MOD-SP4): 36.7 ML
BH CV ECHO MEAS - EDV(TEICH): 16 ML
BH CV ECHO MEAS - EF(CUBED): 59.5 %
BH CV ECHO MEAS - EF(MOD-SP4): 54.8 %
BH CV ECHO MEAS - EF(TEICH): 53.8 %
BH CV ECHO MEAS - ESV(CUBED): 4.3 ML
BH CV ECHO MEAS - ESV(MOD-SP4): 16.6 ML
BH CV ECHO MEAS - ESV(TEICH): 7.4 ML
BH CV ECHO MEAS - FS: 26 %
BH CV ECHO MEAS - IVS/LVPW: 1.2
BH CV ECHO MEAS - IVSD: 2.2 CM
BH CV ECHO MEAS - LA DIMENSION: 2.9 CM
BH CV ECHO MEAS - LA/AO: 0.94
BH CV ECHO MEAS - LV DIASTOLIC VOL/BSA (35-75): 22.8 ML/M^2
BH CV ECHO MEAS - LV IVRT: 0.09 SEC
BH CV ECHO MEAS - LV MASS(C)D: 183.5 GRAMS
BH CV ECHO MEAS - LV MASS(C)DI: 114 GRAMS/M^2
BH CV ECHO MEAS - LV SYSTOLIC VOL/BSA (12-30): 10.3 ML/M^2
BH CV ECHO MEAS - LVIDD: 2.2 CM
BH CV ECHO MEAS - LVIDS: 1.6 CM
BH CV ECHO MEAS - LVLD AP4: 5.7 CM
BH CV ECHO MEAS - LVLS AP4: 5 CM
BH CV ECHO MEAS - LVOT AREA (M): 2.8 CM^2
BH CV ECHO MEAS - LVOT AREA: 2.8 CM^2
BH CV ECHO MEAS - LVOT DIAM: 1.9 CM
BH CV ECHO MEAS - LVPWD: 1.8 CM
BH CV ECHO MEAS - MV A MAX VEL: 108 CM/SEC
BH CV ECHO MEAS - MV DEC SLOPE: 217 CM/SEC^2
BH CV ECHO MEAS - MV E MAX VEL: 59.6 CM/SEC
BH CV ECHO MEAS - MV E/A: 0.55
BH CV ECHO MEAS - RVDD: 2.4 CM
BH CV ECHO MEAS - SI(AO): 75 ML/M^2
BH CV ECHO MEAS - SI(CUBED): 3.9 ML/M^2
BH CV ECHO MEAS - SI(MOD-SP4): 12.5 ML/M^2
BH CV ECHO MEAS - SI(TEICH): 5.4 ML/M^2
BH CV ECHO MEAS - SV(AO): 120.8 ML
BH CV ECHO MEAS - SV(CUBED): 6.3 ML
BH CV ECHO MEAS - SV(MOD-SP4): 20.1 ML
BH CV ECHO MEAS - SV(TEICH): 8.6 ML
MAXIMAL PREDICTED HEART RATE: 154 BPM
STRESS TARGET HR: 131 BPM

## 2021-09-27 PROCEDURE — 93306 TTE W/DOPPLER COMPLETE: CPT

## 2021-09-27 PROCEDURE — 93306 TTE W/DOPPLER COMPLETE: CPT | Performed by: INTERNAL MEDICINE

## 2021-09-27 RX ORDER — SODIUM CHLORIDE 9 MG/ML
8 INJECTION INTRAMUSCULAR; INTRAVENOUS; SUBCUTANEOUS ONCE AS NEEDED
Status: COMPLETED | OUTPATIENT
Start: 2021-09-27 | End: 2021-09-27

## 2021-09-27 RX ADMIN — SODIUM CHLORIDE 8 ML: 9 INJECTION, SOLUTION INTRAMUSCULAR; INTRAVENOUS; SUBCUTANEOUS at 11:30

## 2021-09-28 ENCOUNTER — TELEPHONE (OUTPATIENT)
Dept: CARDIOLOGY | Facility: CLINIC | Age: 66
End: 2021-09-28

## 2021-09-28 NOTE — TELEPHONE ENCOUNTER
For now, have patient to keep a log of her BP (morning and evening readings). She is to see neurology on Thursday, they will be able to assess BP at that time, and advise if changes should be made, or what changes they urge to make. US was done and looked good. She does have LVH, which she has had issues with BP management for years.  Changes were made by PCP? Before her last visit for BP management. I increased her coreg at that time.

## 2021-09-28 NOTE — TELEPHONE ENCOUNTER
Jaclyn with Dr. Fulton office called wanting patient to have sooner appointment due to patient having stroke and patient needing to have better control over BP. We saw patient on 08/10/21 and she was scheduled for a year follow up. PCP is adamant that patient be seen soon. They were made aware that we are full for a couple of weeks, but that I would see with you when patient needed to be scheduled. Patient had echo done yesterday and she see neurologist on Thursday at .

## 2021-09-28 NOTE — PROGRESS NOTES
EF 56-60%, no shunt, trace to mild MR, trace to mild MR, LVH noted. Tight BP log encouraged. Coreg was increased at last visit for better BP control.

## 2021-09-28 NOTE — TELEPHONE ENCOUNTER
Patient was made aware to keep BP log of taking BP morning and night for a couple of weeks and then report back with the readings. Patient was made aware that for now Kyleigh did not feel like she needed an appointment with our office as she was going to see neurologist. She verbalized understanding. Patient was made aware that I will send copy of echo to Dr. Jeffers office in [place] for them to have for her appointment on 09/30/21.

## 2021-10-14 NOTE — TELEPHONE ENCOUNTER
Patient brought B/P readings.     10/4/21 am 130/90, noon 130/85  10/5/21 am 160/90, noon 150/90  10/6/21 am 200/100, noon 140/90  10/7/21 am 130/90, noon 150/90  10/8/21 am 150/94, noon 140/80  10/9/21 am 120/80, noon 160/90  10/10/21 am 150/84, noon 180/90  10/11/21 am 128/74, noon 150/84

## 2021-10-15 ENCOUNTER — TRANSCRIBE ORDERS (OUTPATIENT)
Dept: CARDIOLOGY | Facility: CLINIC | Age: 66
End: 2021-10-15

## 2021-10-15 DIAGNOSIS — I48.91 ATRIAL FIBRILLATION, UNSPECIFIED TYPE (HCC): Primary | ICD-10-CM

## 2021-10-15 RX ORDER — DIGOXIN 125 MCG
TABLET ORAL
Qty: 90 TABLET | OUTPATIENT
Start: 2021-10-15

## 2021-10-15 RX ORDER — NIFEDIPINE 30 MG/1
TABLET, EXTENDED RELEASE ORAL
Qty: 90 TABLET | Refills: 2 | Status: SHIPPED | OUTPATIENT
Start: 2021-10-15

## 2021-10-15 NOTE — TELEPHONE ENCOUNTER
Patient made aware of recommendations to add procardia xl 30mg in the am and continue to take procardia xl 90mg in pm, patient verbalized understanding.

## 2022-03-11 DIAGNOSIS — I10 ESSENTIAL HYPERTENSION: ICD-10-CM

## 2022-03-15 RX ORDER — FUROSEMIDE 40 MG/1
TABLET ORAL
Qty: 180 TABLET | Refills: 2 | Status: SHIPPED | OUTPATIENT
Start: 2022-03-15 | End: 2022-08-11 | Stop reason: SDUPTHER

## 2022-03-15 RX ORDER — CLONIDINE HYDROCHLORIDE 0.2 MG/1
TABLET ORAL
Qty: 270 TABLET | Refills: 2 | Status: SHIPPED | OUTPATIENT
Start: 2022-03-15 | End: 2022-10-21

## 2022-05-11 RX ORDER — NIFEDIPINE 90 MG/1
90 TABLET, EXTENDED RELEASE ORAL NIGHTLY
Qty: 90 TABLET | Refills: 1 | Status: SHIPPED | OUTPATIENT
Start: 2022-05-11 | End: 2022-08-11 | Stop reason: SDUPTHER

## 2022-05-11 NOTE — TELEPHONE ENCOUNTER
Patient request a refill on her Procardia 90 mg QPM to [facility].   She also takes Procardia 30 mg QAM.  (See 10/14/21 telephone encounter)    Script sent to [facility].

## 2022-05-25 RX ORDER — CARVEDILOL 6.25 MG/1
TABLET ORAL
Qty: 180 TABLET | Refills: 3 | Status: SHIPPED | OUTPATIENT
Start: 2022-05-25

## 2022-05-25 RX ORDER — HYDRALAZINE HYDROCHLORIDE 25 MG/1
TABLET, FILM COATED ORAL
Qty: 270 TABLET | Refills: 3 | Status: SHIPPED | OUTPATIENT
Start: 2022-05-25

## 2022-08-11 ENCOUNTER — OFFICE VISIT (OUTPATIENT)
Dept: CARDIOLOGY | Facility: CLINIC | Age: 67
End: 2022-08-11

## 2022-08-11 VITALS
HEART RATE: 64 BPM | HEIGHT: 58 IN | SYSTOLIC BLOOD PRESSURE: 140 MMHG | WEIGHT: 143.2 LBS | DIASTOLIC BLOOD PRESSURE: 92 MMHG | BODY MASS INDEX: 30.06 KG/M2

## 2022-08-11 DIAGNOSIS — E03.9 ACQUIRED HYPOTHYROIDISM: ICD-10-CM

## 2022-08-11 DIAGNOSIS — E78.00 PURE HYPERCHOLESTEROLEMIA: ICD-10-CM

## 2022-08-11 DIAGNOSIS — E11.9 TYPE 2 DIABETES MELLITUS WITHOUT COMPLICATION, WITHOUT LONG-TERM CURRENT USE OF INSULIN: ICD-10-CM

## 2022-08-11 DIAGNOSIS — I10 ESSENTIAL HYPERTENSION: Primary | ICD-10-CM

## 2022-08-11 PROCEDURE — 99214 OFFICE O/P EST MOD 30 MIN: CPT | Performed by: NURSE PRACTITIONER

## 2022-08-11 RX ORDER — FUROSEMIDE 40 MG/1
TABLET ORAL
Qty: 180 TABLET | Refills: 3 | Status: SHIPPED | OUTPATIENT
Start: 2022-08-11

## 2022-08-11 RX ORDER — ATORVASTATIN CALCIUM 40 MG/1
40 TABLET, FILM COATED ORAL NIGHTLY
COMMUNITY

## 2022-08-11 RX ORDER — NIFEDIPINE 90 MG/1
90 TABLET, EXTENDED RELEASE ORAL NIGHTLY
Qty: 90 TABLET | Refills: 3 | Status: SHIPPED | OUTPATIENT
Start: 2022-08-11

## 2022-08-11 NOTE — PROGRESS NOTES
Chief Complaint   Patient presents with   • Follow-up     Cardiac management . Has no cardiac complaints today. Had stroke 9-2021 , she has had follow up with  with notes being on chart .   • LABS     Had labs June 2022 per PCP .   • Med Refill     Needs refills on Diovan and Procardia 30 and 90 mg   .90 day supply to [facility] delivery pharmacy .    • Hypertension     BP is elevated today. She said has not taken Diovan and Lasix today d/t  traveling .       Subjective       Ana Harry is a 66 y.o. female  with difficult to control blood pressure, hyperlipidemia, palpitations, diabetes and hypothyroidism.  Stress test have been negative for ischemia with the last in March 2019.  She has been treated with multiple antihypertensives.  Renal artery ultrasound was ordered followed by CTA and showed no stenosis.  Minoxidil was titrated up and blood pressure control.  In September 2021 she was hospitalized secondary to CVA, Sensoready-motor lacunar stroke syndrome and panic right the malic stroke, etiology was  disease.  She was treated with DAPT for 3 weeks then graduated to 325 mg aspirin.  Statin was changed to higher intensity in form of atorvastatin.  At recent visit with neurologist, , current was continued with plan to follow on an annual basis.    Today she returns the office for a follow-up visit.  She denies chest pain, palpitations, or increase shortness of breath.  She has undergone physical therapy and found beneficial but still has some mild left-sided numbness/weakness.  However, she admits to maintaining her normal daily activities.  According to patient her blood pressure has been better controlled.    Cardiac History:    Past Surgical History:   Procedure Laterality Date   • CARDIOVASCULAR STRESS TEST  04/24/2002    7 min, 86% THR, Neg   • CARDIOVASCULAR STRESS TEST  03/23/2010    4 min, 30 sec 88% THR. /88. Neg   • CARDIOVASCULAR STRESS TEST  07/20/2012    GUERA Gatian-  Negative   • CARDIOVASCULAR STRESS TEST  01/28/2016    R.Stress- 7 Min, 10.1 METS. 91% THR. /104. Negative   • CARDIOVASCULAR STRESS TEST  03/05/2019    8 Min. 10.1 METS. 87% THR. BP- 170/85. Negative.   • CONVERTED (HISTORICAL) HOLTER  03/30/2004    AVG HR 68BPM   • ECHO - CONVERTED  09/17/2007    EF >60% RVSP 42 mmHg   • ECHO - CONVERTED  11/17/2008    EF >60%. RVSP 37 mmHg   • ECHO - CONVERTED  07/08/2010    EF 65% RVSP- 50 mmHg   • ECHO - CONVERTED  10/17/2011    EF >60% RVSP-43 mmHg   • ECHO - CONVERTED  03/13/2014    EF 65% RVSP- 26mmHg   • ECHO - CONVERTED  01/12/2016    EF 65%   • ECHO - CONVERTED  03/05/2019    EF 65%. Mild MR. RVSP 31 mmHg   • ECHO - CONVERTED  09/27/2021    EF 60%. Trace-Mild MR & AI. No shunt   • OTHER SURGICAL HISTORY  02/27/2020    CTA renal arteries-no stenosis   • OTHER SURGICAL HISTORY  11/30/2021    Event monitor- 30 Days. sinus bradycardia   • US CAROTID UNILATERAL  03/20/2014    Normal       Current Outpatient Medications   Medication Sig Dispense Refill   • aspirin  MG tablet Take 325 mg by mouth daily.     • atorvastatin (LIPITOR) 40 MG tablet Take 40 mg by mouth Every Night.     • carvedilol (COREG) 6.25 MG tablet TAKE 1 TABLET TWICE DAILY 180 tablet 3   • cloNIDine (CATAPRES) 0.2 MG tablet TAKE 1 TABLET THREE TIMES DAILY 270 tablet 2   • furosemide (LASIX) 40 MG tablet Take 1 tablet twice a day as needed for edema 180 tablet 3   • gabapentin (NEURONTIN) 800 MG tablet Take 800 mg by mouth 3 (three) times a day.     • glipiZIDE (GLUCOTROL) 2.5 MG 24 hr tablet Take 2.5 mg by mouth 2 (Two) Times a Day.     • hydrALAZINE (APRESOLINE) 25 MG tablet TAKE 1 TABLET THREE TIMES DAILY 270 tablet 3   • hydroxychloroquine (PLAQUENIL) 200 MG tablet Take  by mouth 2 (Two) Times a Day.     • levothyroxine (SYNTHROID, LEVOTHROID) 125 MCG tablet Take 125 mcg by mouth Daily.     • metFORMIN (GLUCOPHAGE) 500 MG tablet Take 500 mg by mouth 2 (two) times a day with meals.     •  NIFEdipine XL (PROCARDIA XL) 30 MG 24 hr tablet Take one tablet by mouth in the morning. 90 tablet 2   • NIFEdipine XL (PROCARDIA XL) 90 MG 24 hr tablet Take 1 tablet by mouth Every Night. 90 tablet 3   • valsartan-hydrochlorothiazide (DIOVAN-HCT) 320-25 MG per tablet Take 1 tablet by mouth Daily. 90 tablet 2   • meloxicam (MOBIC) 15 MG tablet Take 15 mg by mouth daily.       No current facility-administered medications for this visit.       Codeine, Iodine, Morphine and related, and Other    Past Medical History:   Diagnosis Date   • Aortic insufficiency    • Diabetes mellitus (HCC)    • Hiatal hernia    • Hyperlipidemia    • Hypertension    • Palpitations    • Restless legs syndrome (RLS)     followed by Dr Atkins       Social History     Socioeconomic History   • Marital status:    Tobacco Use   • Smoking status: Never Smoker   • Smokeless tobacco: Never Used   Vaping Use   • Vaping Use: Never used   Substance and Sexual Activity   • Alcohol use: No   • Drug use: No       Family History   Problem Relation Age of Onset   • Diabetes Mother    • Heart disease Mother        Review of Systems   Constitutional: Positive for malaise/fatigue (improved).   HENT: Negative for nosebleeds.    Cardiovascular: Negative for chest pain, irregular heartbeat, near-syncope and palpitations.   Respiratory: Negative for shortness of breath and sleep disturbances due to breathing.    Hematologic/Lymphatic: Negative for bleeding problem. Does not bruise/bleed easily.   Musculoskeletal: Positive for joint pain (lennie right knee) and muscle weakness (left, mild).   Gastrointestinal: Negative for abdominal pain, change in bowel habit, heartburn, melena and nausea.   Genitourinary: Negative for dysuria and hematuria.   Neurological: Positive for numbness. Negative for headaches.   Psychiatric/Behavioral: Negative for memory loss. The patient is nervous/anxious.         BP Readings from Last 5 Encounters:   08/11/22 140/92  "  08/10/21 150/90   08/11/20 180/90   02/11/20 (!) 182/100   02/18/19 168/90       Wt Readings from Last 5 Encounters:   08/11/22 65 kg (143 lb 3.2 oz)   08/10/21 69.9 kg (154 lb 3.2 oz)   08/11/20 69 kg (152 lb 3.2 oz)   02/11/20 68.4 kg (150 lb 12.8 oz)   02/18/19 68 kg (150 lb)       Objective     /92 (BP Location: Left arm)   Pulse 64   Ht 147.3 cm (57.99\")   Wt 65 kg (143 lb 3.2 oz)   BMI 29.94 kg/m²     Vitals and nursing note reviewed.   Constitutional:       Appearance: Not in distress.   Neck:      Vascular: No carotid bruit.   Pulmonary:      Effort: Pulmonary effort is normal.      Breath sounds: Normal breath sounds.   Cardiovascular:      Normal rate. Regular rhythm. loud S2.      Murmurs: There is a grade 2/6 systolic murmur.   Pulses:     Intact distal pulses.   Edema:     Peripheral edema absent.          Procedures: none today          Assessment & Plan   Diagnoses and all orders for this visit:    1. Essential hypertension (Primary)  -     furosemide (LASIX) 40 MG tablet; Take 1 tablet twice a day as needed for edema  Dispense: 180 tablet; Refill: 3    2. Pure hypercholesterolemia    3. Acquired hypothyroidism    4. Type 2 diabetes mellitus without complication, without long-term current use of insulin (HCC)    Other orders  -     NIFEdipine XL (PROCARDIA XL) 90 MG 24 hr tablet; Take 1 tablet by mouth Every Night.  Dispense: 90 tablet; Refill: 3      Cardiac  -Last stress test was in 2019 and negative for ischemia.  -Currently patient denies chest pain and declines need for cardiac work-up.    Hypertension  - Cording to patient blood pressure has been better controlled since diagnosed with stroke last fall.  - Continue: Carvedilol 6.25 mg twice daily, clonidine 0.2 mg 3 times daily, Lasix 40 mg once a day.  She admits most often does not take second dose. Hydralazine 25 mg 3 times daily, Procardia 30 mg daily and 90 mg nightly, Diovan/-25 mg daily.  - BMI less than 25 " encouraged    Hypothyroidism  -Followed by PCP.  Admits to taking Synthroid appropriately    Diabetes  -Follows with PCP  -Good diabetic diet encouraged    History CVA  -Continue high-dose aspirin.  Follows with neurologist in regards.  Recurrent symptoms denied.    Patient instructed to contact the office if notices irregular heart rhythm or palpitations.  Cardiac monitor would be advised to look for any evidence of atrial fibrillation.    Patient request annual follow-up visit.  Please call sooner for cardiac concerns.

## 2022-10-21 RX ORDER — CLONIDINE HYDROCHLORIDE 0.2 MG/1
TABLET ORAL
Qty: 270 TABLET | Refills: 2 | Status: SHIPPED | OUTPATIENT
Start: 2022-10-21

## 2023-08-10 ENCOUNTER — OFFICE VISIT (OUTPATIENT)
Dept: CARDIOLOGY | Facility: CLINIC | Age: 68
End: 2023-08-10
Payer: MEDICARE

## 2023-08-10 VITALS
WEIGHT: 147 LBS | DIASTOLIC BLOOD PRESSURE: 88 MMHG | BODY MASS INDEX: 30.86 KG/M2 | SYSTOLIC BLOOD PRESSURE: 162 MMHG | HEART RATE: 60 BPM | HEIGHT: 58 IN

## 2023-08-10 DIAGNOSIS — R00.2 PALPITATIONS: ICD-10-CM

## 2023-08-10 DIAGNOSIS — R42 DIZZINESS: ICD-10-CM

## 2023-08-10 DIAGNOSIS — E11.9 TYPE 2 DIABETES MELLITUS WITHOUT COMPLICATION, WITHOUT LONG-TERM CURRENT USE OF INSULIN: ICD-10-CM

## 2023-08-10 DIAGNOSIS — E78.00 PURE HYPERCHOLESTEROLEMIA: ICD-10-CM

## 2023-08-10 DIAGNOSIS — I10 ESSENTIAL HYPERTENSION: Primary | ICD-10-CM

## 2023-08-10 DIAGNOSIS — R06.02 SHORTNESS OF BREATH: ICD-10-CM

## 2023-08-10 RX ORDER — NIFEDIPINE 90 MG/1
90 TABLET, EXTENDED RELEASE ORAL NIGHTLY
Qty: 90 TABLET | Refills: 4 | Status: SHIPPED | OUTPATIENT
Start: 2023-08-10

## 2023-08-10 RX ORDER — NIFEDIPINE 30 MG/1
TABLET, EXTENDED RELEASE ORAL
Qty: 90 TABLET | Refills: 4 | Status: SHIPPED | OUTPATIENT
Start: 2023-08-10

## 2023-08-10 RX ORDER — LEVOTHYROXINE SODIUM 0.2 MG/1
200 TABLET ORAL DAILY
COMMUNITY

## 2023-08-10 RX ORDER — VALSARTAN AND HYDROCHLOROTHIAZIDE 320; 25 MG/1; MG/1
1 TABLET, FILM COATED ORAL DAILY
Qty: 90 TABLET | Refills: 4 | Status: SHIPPED | OUTPATIENT
Start: 2023-08-10

## 2023-08-10 RX ORDER — HYDRALAZINE HYDROCHLORIDE 25 MG/1
25 TABLET, FILM COATED ORAL 3 TIMES DAILY
Qty: 270 TABLET | Refills: 4 | Status: SHIPPED | OUTPATIENT
Start: 2023-08-10

## 2023-08-10 RX ORDER — CLONIDINE HYDROCHLORIDE 0.2 MG/1
0.2 TABLET ORAL 3 TIMES DAILY
Qty: 270 TABLET | Refills: 4 | Status: SHIPPED | OUTPATIENT
Start: 2023-08-10

## 2023-08-10 RX ORDER — ATORVASTATIN CALCIUM 40 MG/1
40 TABLET, FILM COATED ORAL NIGHTLY
Qty: 90 TABLET | Refills: 4 | Status: SHIPPED | OUTPATIENT
Start: 2023-08-10

## 2023-08-10 RX ORDER — CARVEDILOL 6.25 MG/1
6.25 TABLET ORAL 2 TIMES DAILY
Qty: 180 TABLET | Refills: 4 | Status: SHIPPED | OUTPATIENT
Start: 2023-08-10

## 2023-08-10 NOTE — LETTER
August 10, 2023       No Recipients    Patient: Ana Harry   YOB: 1955   Date of Visit: 8/10/2023       Dear Jose Fulton MD    Ana Harry was in my office today. Below is a copy of my note.    If you have questions, please do not hesitate to call me. I look forward to following Ana along with you.         Sincerely,        CHARLES Mijares        CC:   No Recipients    Chief Complaint   Patient presents with    Follow-up     Cardiac management    Lab     Last labs in chart.    Blood pressure     Elevated at times. She was unable to take Diovan today due to traveling.     Med Refill     Needs scripts sent to [facility] on medications except to lasix-90 day     Subjective      Ana Harry is a 67 y.o. female with labile HTN, hyperlipidemia, diabetes and hypothyroidism.  Stress test have shown no ischemia. Renal artery US followed by CTA showed no VALERIANO. She is on multiple, layered antihypertensives. Minoxidil was titrated up and blood pressure control.  In September 2021 she was hospitalized secondary to CVA, remote lacunar infarct noted. Etiology was  disease.  She was treated with DAPT for 3 weeks then graduated to 325 mg aspirin.  Statin was changed to higher intensity atorvastatin.  At recent visit with neurologist, Dr. Jeffers, current was continued with plan to follow on an annual basis.     She returns today for follow up. She is working in the yard, does admit to shortness of breath with exertion. No new TIA symptoms. Continues to have residual left sided weakness. A1C 6.5%. . BP checked daily, will go up every 2-3 days especially when she is under stress. She did not take Diovan/hctz today due to travel.          Cardiac History:    Past Surgical History:   Procedure Laterality Date    CARDIOVASCULAR STRESS TEST  04/24/2002    7 min, 86% THR, Neg    CARDIOVASCULAR STRESS TEST  03/23/2010    4 min, 30 sec 88% THR. /88. Neg    CARDIOVASCULAR STRESS TEST   07/20/2012    [facility] [place]- Negative    CARDIOVASCULAR STRESS TEST  01/28/2016    R.Stress- 7 Min, 10.1 METS. 91% THR. /104. Negative    CARDIOVASCULAR STRESS TEST  03/05/2019    8 Min. 10.1 METS. 87% THR. BP- 170/85. Negative.    CONVERTED (HISTORICAL) HOLTER  03/30/2004    AVG HR 68BPM    ECHO - CONVERTED  09/17/2007    EF >60% RVSP 42 mmHg    ECHO - CONVERTED  11/17/2008    EF >60%. RVSP 37 mmHg    ECHO - CONVERTED  07/08/2010    EF 65% RVSP- 50 mmHg    ECHO - CONVERTED  10/17/2011    EF >60% RVSP-43 mmHg    ECHO - CONVERTED  03/13/2014    EF 65% RVSP- 26mmHg    ECHO - CONVERTED  01/12/2016    EF 65%    ECHO - CONVERTED  03/05/2019    EF 65%. Mild MR. RVSP 31 mmHg    ECHO - CONVERTED  09/27/2021    EF 60%. Trace-Mild MR & AI. No shunt    OTHER SURGICAL HISTORY  02/27/2020    CTA renal arteries-no stenosis    OTHER SURGICAL HISTORY  11/30/2021    Event monitor- 30 Days. sinus bradycardia    US CAROTID UNILATERAL  03/20/2014    Normal     Current Outpatient Medications   Medication Sig Dispense Refill    aspirin  MG tablet Take 1 tablet by mouth Daily.      atorvastatin (LIPITOR) 40 MG tablet Take 1 tablet by mouth Every Night. 90 tablet 4    carvedilol (COREG) 6.25 MG tablet Take 1 tablet by mouth 2 (Two) Times a Day. 180 tablet 4    cloNIDine (CATAPRES) 0.2 MG tablet Take 1 tablet by mouth 3 (Three) Times a Day. 270 tablet 4    furosemide (LASIX) 40 MG tablet TAKE 1 TABLET TWICE A DAY AS NEEDED FOR EDEMA 180 tablet 3    gabapentin (NEURONTIN) 800 MG tablet Take 1 tablet by mouth 3 (Three) Times a Day.      glipiZIDE (GLUCOTROL) 2.5 MG 24 hr tablet Take 1 tablet by mouth 2 (Two) Times a Day.      hydrALAZINE (APRESOLINE) 25 MG tablet Take 1 tablet by mouth 3 (Three) Times a Day. 270 tablet 4    hydroxychloroquine (PLAQUENIL) 200 MG tablet Take  by mouth 2 (Two) Times a Day.      levothyroxine (SYNTHROID, LEVOTHROID) 200 MCG tablet Take 1 tablet by mouth Daily.      meloxicam  (MOBIC) 15 MG tablet Take 1 tablet by mouth Daily.      metFORMIN (GLUCOPHAGE) 500 MG tablet Take 1 tablet by mouth 2 (Two) Times a Day With Meals.      NIFEdipine XL (PROCARDIA XL) 30 MG 24 hr tablet Take one tablet by mouth in the morning. 90 tablet 4    NIFEdipine XL (PROCARDIA XL) 90 MG 24 hr tablet Take 1 tablet by mouth Every Night. 90 tablet 4    valsartan-hydrochlorothiazide (DIOVAN-HCT) 320-25 MG per tablet Take 1 tablet by mouth Daily. 90 tablet 4     No current facility-administered medications for this visit.     Codeine, Iodine, Morphine and related, and Other    Past Medical History:   Diagnosis Date    Aortic insufficiency     Diabetes mellitus     H/O bilateral cataract extraction     Hiatal hernia     Hyperlipidemia     Hypertension     Palpitations     Restless legs syndrome (RLS)     followed by Dr Atkins     Social History     Socioeconomic History    Marital status:    Tobacco Use    Smoking status: Never    Smokeless tobacco: Never   Vaping Use    Vaping Use: Never used   Substance and Sexual Activity    Alcohol use: No    Drug use: No    Sexual activity: Defer     Family History   Problem Relation Age of Onset    Diabetes Mother     Heart disease Mother      Review of Systems   Constitutional: Positive for weight gain (4). Negative for decreased appetite and malaise/fatigue.   HENT: Negative.     Eyes:  Negative for blurred vision.   Cardiovascular:  Positive for dyspnea on exertion. Negative for chest pain, leg swelling, palpitations and syncope.   Respiratory:  Negative for shortness of breath and sleep disturbances due to breathing.    Endocrine: Negative.    Hematologic/Lymphatic: Negative for bleeding problem. Does not bruise/bleed easily.   Skin: Negative.    Musculoskeletal:  Negative for falls and myalgias.   Gastrointestinal:  Negative for abdominal pain, heartburn and melena.   Genitourinary:  Negative for hematuria.   Neurological:  Negative for  "dizziness and light-headedness.   Psychiatric/Behavioral:  Negative for altered mental status.    Allergic/Immunologic: Negative.       Objective    /88 (BP Location: Left arm)   Pulse 60   Ht 147.3 cm (57.99\")   Wt 66.7 kg (147 lb)   BMI 30.73 kg/mý     Vitals and nursing note reviewed.   Constitutional:       General: Not in acute distress.     Appearance: Well-developed. Not diaphoretic.   Eyes:      Pupils: Pupils are equal, round, and reactive to light.   HENT:      Head: Normocephalic.   Pulmonary:      Effort: Pulmonary effort is normal. No respiratory distress.      Breath sounds: Normal breath sounds.   Cardiovascular:      Normal rate. Regular rhythm.   Pulses:     Intact distal pulses.   Edema:     Peripheral edema absent.   Abdominal:      General: Bowel sounds are normal.      Palpations: Abdomen is soft.   Musculoskeletal: Normal range of motion.      Cervical back: Normal range of motion. Skin:     General: Skin is warm and dry.   Neurological:      Mental Status: Alert and oriented to person, place, and time.      Procedures          Problem List Items Addressed This Visit          Cardiac and Vasculature    Essential hypertension - Primary    Relevant Medications    carvedilol (COREG) 6.25 MG tablet    cloNIDine (CATAPRES) 0.2 MG tablet    valsartan-hydrochlorothiazide (DIOVAN-HCT) 320-25 MG per tablet    NIFEdipine XL (PROCARDIA XL) 30 MG 24 hr tablet    NIFEdipine XL (PROCARDIA XL) 90 MG 24 hr tablet    hydrALAZINE (APRESOLINE) 25 MG tablet    Other Relevant Orders    Stress Test With Myocardial Perfusion One Day    Adult Transthoracic Echo Complete W/ Cont if Necessary Per Protocol    US Carotid Bilateral    Pure hypercholesterolemia    Relevant Medications    atorvastatin (LIPITOR) 40 MG tablet    Other Relevant Orders    Stress Test With Myocardial Perfusion One Day    Adult Transthoracic Echo Complete W/ Cont if Necessary Per Protocol    US Carotid Bilateral       Endocrine and " Metabolic    Type 2 diabetes mellitus without complication, without long-term current use of insulin    Relevant Orders    Stress Test With Myocardial Perfusion One Day    Adult Transthoracic Echo Complete W/ Cont if Necessary Per Protocol     Other Visit Diagnoses       Palpitations        Relevant Orders    Stress Test With Myocardial Perfusion One Day    Adult Transthoracic Echo Complete W/ Cont if Necessary Per Protocol    Shortness of breath        Relevant Orders    Stress Test With Myocardial Perfusion One Day    Adult Transthoracic Echo Complete W/ Cont if Necessary Per Protocol    Dizziness        Relevant Orders    US Carotid Bilateral           HTN  -elevated at 162/88  -advised to take diovan/hctz upon returning home  -continue hydralazine, nifedipine, carvedilol, clonidine, Lasix    Hyperlipidemia  -atrovastatin 40 mg  -Labs per Dr. Fulton  -only page 3 received, could we get complete report?    JONES  -anginal equivalent  -repeat nuclear stress, echocardiogram     CVA  -on statin, aspirin, antihypertensives  -no recurrent TIA  -Carotid US to rule out stenosis    Refills sent. Continue to monitor BP. Further recommendations to follow echo, stress, carotid US     BMI is >= 30 and <35. (Class 1 Obesity). The following options were offered after discussion;: nutrition counseling/recommendations              Electronically signed by CHARLES Mijares,  August 10, 2023 14:36 EDT

## 2023-08-10 NOTE — PROGRESS NOTES
Chief Complaint   Patient presents with    Follow-up     Cardiac management    Lab     Last labs in chart.    Blood pressure     Elevated at times. She was unable to take Diovan today due to traveling.     Med Refill     Needs scripts sent to [facility] on medications except to lasix-90 day     Subjective       Ana Harry is a 67 y.o. female with labile HTN, hyperlipidemia, diabetes and hypothyroidism.  Stress test have shown no ischemia. Renal artery US followed by CTA showed no VALERIANO. She is on multiple, layered antihypertensives. Minoxidil was titrated up and blood pressure control.  In September 2021 she was hospitalized secondary to CVA, remote lacunar infarct noted. Etiology was  disease.  She was treated with DAPT for 3 weeks then graduated to 325 mg aspirin.  Statin was changed to higher intensity atorvastatin.  At recent visit with neurologist, Dr. Jeffers, current was continued with plan to follow on an annual basis.     She returns today for follow up. She is working in the yard, does admit to shortness of breath with exertion. No new TIA symptoms. Continues to have residual left sided weakness. A1C 6.5%. . BP checked daily, will go up every 2-3 days especially when she is under stress. She did not take Diovan/hctz today due to travel.          Cardiac History:    Past Surgical History:   Procedure Laterality Date    CARDIOVASCULAR STRESS TEST  04/24/2002    7 min, 86% THR, Neg    CARDIOVASCULAR STRESS TEST  03/23/2010    4 min, 30 sec 88% THR. /88. Neg    CARDIOVASCULAR STRESS TEST  07/20/2012    [facility]- Negative    CARDIOVASCULAR STRESS TEST  01/28/2016    R.Stress- 7 Min, 10.1 METS. 91% THR. /104. Negative    CARDIOVASCULAR STRESS TEST  03/05/2019    8 Min. 10.1 METS. 87% THR. BP- 170/85. Negative.    CONVERTED (HISTORICAL) HOLTER  03/30/2004    AVG HR 68BPM    ECHO - CONVERTED  09/17/2007    EF >60% RVSP 42 mmHg    ECHO - CONVERTED  11/17/2008    EF >60%. RVSP 37 mmHg    ECHO  - CONVERTED  07/08/2010    EF 65% RVSP- 50 mmHg    ECHO - CONVERTED  10/17/2011    EF >60% RVSP-43 mmHg    ECHO - CONVERTED  03/13/2014    EF 65% RVSP- 26mmHg    ECHO - CONVERTED  01/12/2016    EF 65%    ECHO - CONVERTED  03/05/2019    EF 65%. Mild MR. RVSP 31 mmHg    ECHO - CONVERTED  09/27/2021    EF 60%. Trace-Mild MR & AI. No shunt    OTHER SURGICAL HISTORY  02/27/2020    CTA renal arteries-no stenosis    OTHER SURGICAL HISTORY  11/30/2021    Event monitor- 30 Days. sinus bradycardia    US CAROTID UNILATERAL  03/20/2014    Normal     Current Outpatient Medications   Medication Sig Dispense Refill    aspirin  MG tablet Take 1 tablet by mouth Daily.      atorvastatin (LIPITOR) 40 MG tablet Take 1 tablet by mouth Every Night. 90 tablet 4    carvedilol (COREG) 6.25 MG tablet Take 1 tablet by mouth 2 (Two) Times a Day. 180 tablet 4    cloNIDine (CATAPRES) 0.2 MG tablet Take 1 tablet by mouth 3 (Three) Times a Day. 270 tablet 4    furosemide (LASIX) 40 MG tablet TAKE 1 TABLET TWICE A DAY AS NEEDED FOR EDEMA 180 tablet 3    gabapentin (NEURONTIN) 800 MG tablet Take 1 tablet by mouth 3 (Three) Times a Day.      glipiZIDE (GLUCOTROL) 2.5 MG 24 hr tablet Take 1 tablet by mouth 2 (Two) Times a Day.      hydrALAZINE (APRESOLINE) 25 MG tablet Take 1 tablet by mouth 3 (Three) Times a Day. 270 tablet 4    hydroxychloroquine (PLAQUENIL) 200 MG tablet Take  by mouth 2 (Two) Times a Day.      levothyroxine (SYNTHROID, LEVOTHROID) 200 MCG tablet Take 1 tablet by mouth Daily.      meloxicam (MOBIC) 15 MG tablet Take 1 tablet by mouth Daily.      metFORMIN (GLUCOPHAGE) 500 MG tablet Take 1 tablet by mouth 2 (Two) Times a Day With Meals.      NIFEdipine XL (PROCARDIA XL) 30 MG 24 hr tablet Take one tablet by mouth in the morning. 90 tablet 4    NIFEdipine XL (PROCARDIA XL) 90 MG 24 hr tablet Take 1 tablet by mouth Every Night. 90 tablet 4    valsartan-hydrochlorothiazide (DIOVAN-HCT) 320-25 MG per tablet Take 1 tablet by mouth  "Daily. 90 tablet 4     No current facility-administered medications for this visit.     Codeine, Iodine, Morphine and related, and Other    Past Medical History:   Diagnosis Date    Aortic insufficiency     Diabetes mellitus     H/O bilateral cataract extraction     Hiatal hernia     Hyperlipidemia     Hypertension     Palpitations     Restless legs syndrome (RLS)     followed by Dr Atkins     Social History     Socioeconomic History    Marital status:    Tobacco Use    Smoking status: Never    Smokeless tobacco: Never   Vaping Use    Vaping Use: Never used   Substance and Sexual Activity    Alcohol use: No    Drug use: No    Sexual activity: Defer     Family History   Problem Relation Age of Onset    Diabetes Mother     Heart disease Mother      Review of Systems   Constitutional: Positive for weight gain (4). Negative for decreased appetite and malaise/fatigue.   HENT: Negative.     Eyes:  Negative for blurred vision.   Cardiovascular:  Positive for dyspnea on exertion. Negative for chest pain, leg swelling, palpitations and syncope.   Respiratory:  Negative for shortness of breath and sleep disturbances due to breathing.    Endocrine: Negative.    Hematologic/Lymphatic: Negative for bleeding problem. Does not bruise/bleed easily.   Skin: Negative.    Musculoskeletal:  Negative for falls and myalgias.   Gastrointestinal:  Negative for abdominal pain, heartburn and melena.   Genitourinary:  Negative for hematuria.   Neurological:  Negative for dizziness and light-headedness.   Psychiatric/Behavioral:  Negative for altered mental status.    Allergic/Immunologic: Negative.       Objective     /88 (BP Location: Left arm)   Pulse 60   Ht 147.3 cm (57.99\")   Wt 66.7 kg (147 lb)   BMI 30.73 kg/mý     Vitals and nursing note reviewed.   Constitutional:       General: Not in acute distress.     Appearance: Well-developed. Not diaphoretic.   Eyes:      Pupils: Pupils are equal, round, and reactive to " light.   HENT:      Head: Normocephalic.   Pulmonary:      Effort: Pulmonary effort is normal. No respiratory distress.      Breath sounds: Normal breath sounds.   Cardiovascular:      Normal rate. Regular rhythm.   Pulses:     Intact distal pulses.   Edema:     Peripheral edema absent.   Abdominal:      General: Bowel sounds are normal.      Palpations: Abdomen is soft.   Musculoskeletal: Normal range of motion.      Cervical back: Normal range of motion. Skin:     General: Skin is warm and dry.   Neurological:      Mental Status: Alert and oriented to person, place, and time.      Procedures          Problem List Items Addressed This Visit          Cardiac and Vasculature    Essential hypertension - Primary    Relevant Medications    carvedilol (COREG) 6.25 MG tablet    cloNIDine (CATAPRES) 0.2 MG tablet    valsartan-hydrochlorothiazide (DIOVAN-HCT) 320-25 MG per tablet    NIFEdipine XL (PROCARDIA XL) 30 MG 24 hr tablet    NIFEdipine XL (PROCARDIA XL) 90 MG 24 hr tablet    hydrALAZINE (APRESOLINE) 25 MG tablet    Other Relevant Orders    Stress Test With Myocardial Perfusion One Day    Adult Transthoracic Echo Complete W/ Cont if Necessary Per Protocol    US Carotid Bilateral    Pure hypercholesterolemia    Relevant Medications    atorvastatin (LIPITOR) 40 MG tablet    Other Relevant Orders    Stress Test With Myocardial Perfusion One Day    Adult Transthoracic Echo Complete W/ Cont if Necessary Per Protocol    US Carotid Bilateral       Endocrine and Metabolic    Type 2 diabetes mellitus without complication, without long-term current use of insulin    Relevant Orders    Stress Test With Myocardial Perfusion One Day    Adult Transthoracic Echo Complete W/ Cont if Necessary Per Protocol     Other Visit Diagnoses       Palpitations        Relevant Orders    Stress Test With Myocardial Perfusion One Day    Adult Transthoracic Echo Complete W/ Cont if Necessary Per Protocol    Shortness of breath        Relevant  Orders    Stress Test With Myocardial Perfusion One Day    Adult Transthoracic Echo Complete W/ Cont if Necessary Per Protocol    Dizziness        Relevant Orders    US Carotid Bilateral           HTN  -elevated at 162/88  -advised to take diovan/hctz upon returning home  -continue hydralazine, nifedipine, carvedilol, clonidine, Lasix    Hyperlipidemia  -atrovastatin 40 mg  -Labs per Dr. Fulton  -only page 3 received, could we get complete report?    JONES  -anginal equivalent  -repeat nuclear stress, echocardiogram     CVA  -on statin, aspirin, antihypertensives  -no recurrent TIA  -Carotid US to rule out stenosis    Refills sent. Continue to monitor BP. Further recommendations to follow echo, stress, carotid US     BMI is >= 30 and <35. (Class 1 Obesity). The following options were offered after discussion;: nutrition counseling/recommendations              Electronically signed by CHARLES Mijares,  August 10, 2023 14:36 EDT

## 2023-08-29 ENCOUNTER — HOSPITAL ENCOUNTER (OUTPATIENT)
Dept: CARDIOLOGY | Facility: HOSPITAL | Age: 68
Discharge: HOME | End: 2023-08-29
Payer: MEDICARE

## 2023-08-29 DIAGNOSIS — E11.9 TYPE 2 DIABETES MELLITUS WITHOUT COMPLICATION, WITHOUT LONG-TERM CURRENT USE OF INSULIN: ICD-10-CM

## 2023-08-29 DIAGNOSIS — R00.2 PALPITATIONS: ICD-10-CM

## 2023-08-29 DIAGNOSIS — I10 ESSENTIAL HYPERTENSION: ICD-10-CM

## 2023-08-29 DIAGNOSIS — R06.02 SHORTNESS OF BREATH: ICD-10-CM

## 2023-08-29 DIAGNOSIS — E78.00 PURE HYPERCHOLESTEROLEMIA: ICD-10-CM

## 2023-08-29 DIAGNOSIS — R42 DIZZINESS: ICD-10-CM

## 2023-08-29 LAB
BH CV ECHO MEAS - ACS: 2.14 CM
BH CV ECHO MEAS - AO MAX PG: 5.5 MMHG
BH CV ECHO MEAS - AO MEAN PG: 3 MMHG
BH CV ECHO MEAS - AO ROOT DIAM: 3.2 CM
BH CV ECHO MEAS - AO V2 MAX: 117 CM/SEC
BH CV ECHO MEAS - AO V2 VTI: 28 CM
BH CV ECHO MEAS - EDV(CUBED): 60.2 ML
BH CV ECHO MEAS - EDV(MOD-SP4): 61.6 ML
BH CV ECHO MEAS - EF(MOD-SP4): 66.4 %
BH CV ECHO MEAS - EF_3D-VOL: 65 %
BH CV ECHO MEAS - ESV(CUBED): 14 ML
BH CV ECHO MEAS - ESV(MOD-SP4): 20.7 ML
BH CV ECHO MEAS - FS: 38.5 %
BH CV ECHO MEAS - IVS/LVPW: 1.4 CM
BH CV ECHO MEAS - IVSD: 1.86 CM
BH CV ECHO MEAS - LA DIMENSION: 3.7 CM
BH CV ECHO MEAS - LAT PEAK E' VEL: 6.3 CM/SEC
BH CV ECHO MEAS - LV DIASTOLIC VOL/BSA (35-75): 39 CM2
BH CV ECHO MEAS - LV MASS(C)D: 249.5 GRAMS
BH CV ECHO MEAS - LV SYSTOLIC VOL/BSA (12-30): 13.1 CM2
BH CV ECHO MEAS - LVIDD: 3.9 CM
BH CV ECHO MEAS - LVIDS: 2.41 CM
BH CV ECHO MEAS - LVPWD: 1.33 CM
BH CV ECHO MEAS - MED PEAK E' VEL: 4.2 CM/SEC
BH CV ECHO MEAS - MV A MAX VEL: 109 CM/SEC
BH CV ECHO MEAS - MV DEC TIME: 0.23 MSEC
BH CV ECHO MEAS - MV E MAX VEL: 120 CM/SEC
BH CV ECHO MEAS - MV E/A: 1.1
BH CV ECHO MEAS - RAP SYSTOLE: 10 MMHG
BH CV ECHO MEAS - RVSP: 30.6 MMHG
BH CV ECHO MEAS - SI(MOD-SP4): 25.9 ML/M2
BH CV ECHO MEAS - SV(MOD-SP4): 40.9 ML
BH CV ECHO MEAS - TR MAX PG: 20.6 MMHG
BH CV ECHO MEAS - TR MAX VEL: 226.7 CM/SEC
BH CV ECHO MEASUREMENTS AVERAGE E/E' RATIO: 22.86
BH CV REST NUCLEAR ISOTOPE DOSE: 10 MCI
BH CV STRESS NUCLEAR ISOTOPE DOSE: 30 MCI
BH CV STRESS RECOVERY BP: NORMAL MMHG
BH CV STRESS RECOVERY HR: 73 BPM
BH CV XLRA - RV BASE: 2.8 CM
BH CV XLRA - RV LENGTH: 5.8 CM
BH CV XLRA - RV MID: 2.18 CM
LEFT ATRIUM VOLUME INDEX: 26.4 ML/M2
LV EF NUC BP: 65 %
MAXIMAL PREDICTED HEART RATE: 152 BPM
PERCENT MAX PREDICTED HR: 88.16 %
STRESS BASELINE BP: NORMAL MMHG
STRESS BASELINE HR: 67 BPM
STRESS PERCENT HR: 104 %
STRESS POST ESTIMATED WORKLOAD: 7 METS
STRESS POST EXERCISE DUR MIN: 4 MIN
STRESS POST PEAK BP: NORMAL MMHG
STRESS POST PEAK HR: 134 BPM
STRESS TARGET HR: 129 BPM

## 2023-08-29 PROCEDURE — 78452 HT MUSCLE IMAGE SPECT MULT: CPT

## 2023-08-29 PROCEDURE — 93880 EXTRACRANIAL BILAT STUDY: CPT

## 2023-08-29 PROCEDURE — 0 TECHNETIUM SESTAMIBI: Performed by: INTERNAL MEDICINE

## 2023-08-29 PROCEDURE — A9500 TC99M SESTAMIBI: HCPCS | Performed by: INTERNAL MEDICINE

## 2023-08-29 PROCEDURE — 93017 CV STRESS TEST TRACING ONLY: CPT

## 2023-08-29 PROCEDURE — 93306 TTE W/DOPPLER COMPLETE: CPT

## 2023-08-29 RX ADMIN — TECHNETIUM TC 99M SESTAMIBI 1 DOSE: 1 INJECTION INTRAVENOUS at 09:59

## 2023-08-29 RX ADMIN — TECHNETIUM TC 99M SESTAMIBI 1 DOSE: 1 INJECTION INTRAVENOUS at 08:23

## 2023-08-30 ENCOUNTER — TELEPHONE (OUTPATIENT)
Dept: CARDIOLOGY | Facility: CLINIC | Age: 68
End: 2023-08-30
Payer: MEDICARE

## 2023-08-30 RX ORDER — EZETIMIBE 10 MG/1
10 TABLET ORAL DAILY
Qty: 90 TABLET | Refills: 3 | Status: SHIPPED | OUTPATIENT
Start: 2023-08-30

## 2023-08-30 NOTE — TELEPHONE ENCOUNTER
Attempted to notify patient, unable to reach and unable to leave message due to no answering service.

## 2023-08-30 NOTE — TELEPHONE ENCOUNTER
Labs received, potassium normal. Add spironolactone as advised.    Also noted LDL elevated at 127. If she is taking atorvastatin as prescribed, recommend adding Zetia 10 mg. Sent to [facility].

## 2023-08-31 NOTE — TELEPHONE ENCOUNTER
Attempted to notify patient of labs, unable to reach, unable to leave message due to no answering service.

## 2023-09-01 NOTE — TELEPHONE ENCOUNTER
Patient made aware of lab results from PCP office. Patient reports she is taking atorvastatin, aware of recommendations to add zetia 10 mg daily, understanding voiced.

## 2023-09-08 ENCOUNTER — TELEPHONE (OUTPATIENT)
Dept: CARDIOLOGY | Facility: CLINIC | Age: 68
End: 2023-09-08
Payer: MEDICARE

## 2023-09-08 NOTE — TELEPHONE ENCOUNTER
I had scheduled patient for the MRA of neck. [facility] has scheduled with/without contrast. I explained to them that the order was without contrast due to allergy to IV dye, they said she would need premedicated.     Can you place order? Thanks.

## 2023-09-11 RX ORDER — PREDNISONE 50 MG/1
TABLET ORAL
Qty: 3 TABLET | Refills: 0 | Status: SHIPPED | OUTPATIENT
Start: 2023-09-11 | End: 2023-09-21 | Stop reason: SDUPTHER

## 2023-09-11 NOTE — TELEPHONE ENCOUNTER
Premedication protocol per [facility]-    Prednisone 50 mg PO 13 hours prior to study  Prednisone 50 mg PO 7 hours prior to study  Prednisone 50 mg PO 1 hour prior to study     Take Benadryl 50 mg with her to the hospital. Do not take. Can be given post procedure if she develops any reaction.     Prednisone [facility]     Can we let her know, directions are on the bottle. She will need to get OTC benadryl (diphenhydramine)

## 2023-09-11 NOTE — TELEPHONE ENCOUNTER
I spoke with patient and she is aware of instruction for Prednisone  ( called to [facility] pharmacy) , she is also aware to take OTC benadryl with her to hospital  for after procedure . She verbalizes understanding

## 2023-09-20 ENCOUNTER — TELEPHONE (OUTPATIENT)
Dept: CARDIOLOGY | Facility: CLINIC | Age: 68
End: 2023-09-20
Payer: MEDICARE

## 2023-09-20 DIAGNOSIS — I65.22 CAROTID ARTERY STENOSIS, SYMPTOMATIC, LEFT: Primary | ICD-10-CM

## 2023-09-20 DIAGNOSIS — I63.89 CEREBROVASCULAR ACCIDENT (CVA) DUE TO OTHER MECHANISM: ICD-10-CM

## 2023-09-20 NOTE — TELEPHONE ENCOUNTER
Caller: Ana Harry    Relationship: Self    Best call back number: [phone]    Caller requesting test results:     What test was performed: CAROTID    When was the test performed: YESTERDAY    Where was the test performed:     Additional notes: PATIENT SAYS SHE MISSED A CALL FIGURED ITS ABOUT HER TEST. PLEASE CALL BACK

## 2023-09-21 RX ORDER — PREDNISONE 50 MG/1
TABLET ORAL
Qty: 3 TABLET | Refills: 0 | Status: SHIPPED | OUTPATIENT
Start: 2023-09-21

## 2023-09-21 NOTE — TELEPHONE ENCOUNTER
Caller: Lester Ochoa    Relationship: Self    Best call back number: [phone]    What is the best time to reach you: ANYTIME    Do you know the name of the person who called: LESTER OCHOA    What was the call regarding: PATIENT CALLED IN TO CHECK ON THIS AS SHE MISSED A CALL ON 09.20.23.

## 2023-09-22 ENCOUNTER — TELEPHONE (OUTPATIENT)
Dept: CARDIOLOGY | Facility: CLINIC | Age: 68
End: 2023-09-22

## 2023-09-22 DIAGNOSIS — E11.9 TYPE 2 DIABETES MELLITUS WITHOUT COMPLICATION, WITHOUT LONG-TERM CURRENT USE OF INSULIN: Primary | ICD-10-CM

## 2023-09-22 NOTE — TELEPHONE ENCOUNTER
Please make an order for a CMP to send with patient's CTA order for [facility] since patient has DM.

## 2024-07-03 RX ORDER — SPIRONOLACTONE 25 MG/1
25 TABLET ORAL DAILY
Qty: 90 TABLET | Refills: 1 | Status: SHIPPED | OUTPATIENT
Start: 2024-07-03

## 2024-07-17 RX ORDER — EZETIMIBE 10 MG/1
10 TABLET ORAL DAILY
Qty: 30 TABLET | Refills: 0 | Status: SHIPPED | OUTPATIENT
Start: 2024-07-17

## 2024-07-25 ENCOUNTER — TELEPHONE (OUTPATIENT)
Dept: CARDIOLOGY | Facility: CLINIC | Age: 69
End: 2024-07-25
Payer: MEDICARE

## 2024-07-25 DIAGNOSIS — I10 ESSENTIAL HYPERTENSION: ICD-10-CM

## 2024-07-25 RX ORDER — FUROSEMIDE 40 MG/1
TABLET ORAL
Qty: 180 TABLET | Refills: 3 | Status: SHIPPED | OUTPATIENT
Start: 2024-07-25

## 2024-07-25 NOTE — TELEPHONE ENCOUNTER
Received refill request from [facility] for lasix 40 mg bid PRN for edema.    No recent labs.    Do you want to refill?

## 2024-08-07 ENCOUNTER — TELEPHONE (OUTPATIENT)
Dept: CARDIOLOGY | Facility: CLINIC | Age: 69
End: 2024-08-07
Payer: MEDICARE

## 2024-08-07 NOTE — TELEPHONE ENCOUNTER
Caller: Ana Harry    Relationship: Self    Best call back number: [phone]    What is the best time to reach you: ANYTIME    Who are you requesting to speak with (clinical staff, provider,  specific staff member): CLINICAL    Do you know the name of the person who called: NOT SURE    What was the call regarding: PATIENT IS RETURNING A MISSED CALL, IF [facility]'S OFFICE IS TRYING TO REACH PATIENT, PLEASE CALL BACK.    Is it okay if the provider responds through MyChart: CALL

## 2024-08-08 ENCOUNTER — OFFICE VISIT (OUTPATIENT)
Dept: CARDIOLOGY | Facility: CLINIC | Age: 69
End: 2024-08-08
Payer: MEDICARE

## 2024-08-08 VITALS
BODY MASS INDEX: 33.63 KG/M2 | SYSTOLIC BLOOD PRESSURE: 220 MMHG | HEART RATE: 60 BPM | HEIGHT: 58 IN | WEIGHT: 160.2 LBS | DIASTOLIC BLOOD PRESSURE: 110 MMHG

## 2024-08-08 DIAGNOSIS — E78.00 PURE HYPERCHOLESTEROLEMIA: ICD-10-CM

## 2024-08-08 DIAGNOSIS — Z91.148 NONCOMPLIANCE WITH MEDICATIONS: ICD-10-CM

## 2024-08-08 DIAGNOSIS — R06.02 SHORTNESS OF BREATH: ICD-10-CM

## 2024-08-08 DIAGNOSIS — I10 ESSENTIAL HYPERTENSION: Primary | ICD-10-CM

## 2024-08-08 RX ORDER — NIFEDIPINE 30 MG/1
TABLET, EXTENDED RELEASE ORAL
Qty: 90 TABLET | Refills: 4 | Status: SHIPPED | OUTPATIENT
Start: 2024-08-08

## 2024-08-08 RX ORDER — ATORVASTATIN CALCIUM 40 MG/1
40 TABLET, FILM COATED ORAL NIGHTLY
Qty: 90 TABLET | Refills: 4 | Status: SHIPPED | OUTPATIENT
Start: 2024-08-08

## 2024-08-08 RX ORDER — HYDRALAZINE HYDROCHLORIDE 50 MG/1
50 TABLET, FILM COATED ORAL 3 TIMES DAILY
Qty: 270 TABLET | Refills: 3 | Status: SHIPPED | OUTPATIENT
Start: 2024-08-08

## 2024-08-08 RX ORDER — FUROSEMIDE 40 MG/1
TABLET ORAL
Qty: 180 TABLET | Refills: 3 | Status: SHIPPED | OUTPATIENT
Start: 2024-08-08

## 2024-08-08 RX ORDER — CLONIDINE HYDROCHLORIDE 0.2 MG/1
0.2 TABLET ORAL 3 TIMES DAILY
Qty: 270 TABLET | Refills: 4 | Status: SHIPPED | OUTPATIENT
Start: 2024-08-08

## 2024-08-08 RX ORDER — SPIRONOLACTONE 25 MG/1
25 TABLET ORAL DAILY
Qty: 90 TABLET | Refills: 1 | Status: SHIPPED | OUTPATIENT
Start: 2024-08-08

## 2024-08-08 RX ORDER — CLOPIDOGREL BISULFATE 75 MG/1
75 TABLET ORAL DAILY
Qty: 90 TABLET | Refills: 3 | Status: SHIPPED | OUTPATIENT
Start: 2024-08-08

## 2024-08-08 RX ORDER — BUPROPION HYDROCHLORIDE 150 MG/1
150 TABLET, EXTENDED RELEASE ORAL 2 TIMES DAILY
COMMUNITY

## 2024-08-08 RX ORDER — LEVOTHYROXINE SODIUM 0.03 MG/1
25 TABLET ORAL
COMMUNITY

## 2024-08-08 RX ORDER — NIFEDIPINE 90 MG/1
90 TABLET, EXTENDED RELEASE ORAL NIGHTLY
Qty: 90 TABLET | Refills: 4 | Status: SHIPPED | OUTPATIENT
Start: 2024-08-08

## 2024-08-08 RX ORDER — VALSARTAN AND HYDROCHLOROTHIAZIDE 320; 25 MG/1; MG/1
1 TABLET, FILM COATED ORAL DAILY
Qty: 90 TABLET | Refills: 4 | Status: SHIPPED | OUTPATIENT
Start: 2024-08-08

## 2024-08-08 RX ORDER — EZETIMIBE 10 MG/1
10 TABLET ORAL DAILY
Qty: 90 TABLET | Refills: 3 | Status: SHIPPED | OUTPATIENT
Start: 2024-08-08

## 2024-08-08 RX ORDER — CHOLECALCIFEROL (VITAMIN D3) 25 MCG
1000 TABLET ORAL DAILY
COMMUNITY

## 2024-08-08 RX ORDER — CARVEDILOL 6.25 MG/1
6.25 TABLET ORAL 2 TIMES DAILY
Qty: 180 TABLET | Refills: 4 | Status: SHIPPED | OUTPATIENT
Start: 2024-08-08

## 2024-08-08 NOTE — PROGRESS NOTES
"Chief Complaint   Patient presents with    Follow-up     Cardiac management    Shortness of Breath     Notices at rest and exertion.     Chest Pain     Has had couple episodes of tightness in chest with shortness of breath. Once she was at a family reunion started feeling \"funny in chest\", had chest tightness and short of breath, went to ER at [facility] . Having increased heartburn.     Edema     Having swelling in lower extremities.     Blood pressure      Has been fluctuating. Reports the other morning at 4am /107 with HR of 101.     Med Refill     Needs refills on cardiac medications except lasix-90 day.        HPI:  PEMA Harry is a 68 y.o. female with labile HTN, hyperlipidemia, diabetes and hypothyroidism.  Stress test have shown no ischemia. Renal artery US followed by CTA showed no VALERIANO. She is on multiple, layered antihypertensives. Minoxidil was titrated up and blood pressure control.  In September 2021 she was hospitalized secondary to CVA, remote lacunar infarct noted. Etiology was  disease.  She was treated with DAPT for 3 weeks then graduated to 325 mg aspirin.  Statin was changed to higher intensity atorvastatin.  At recent visit with neurologist, Dr. Jeffers, current was continued with plan to follow on an annual basis.     She returns today for follow up. She is working in the yard, does admit to shortness of breath with exertion. No new TIA symptoms. Continues to have residual left sided weakness. A1C 6.5%. . BP checked daily, will go up every 2-3 days especially when she is under stress. She did not take Diovan/hctz today due to travel.     She returns today for follow-up visit. Patient denies chest pain, pressure, palpitations, tightness, dizziness. She reports SOB with rest and with exertion. She had one episode in June where she had chest tightness and smothering. She went to [facility] ER and where she reports normal EKG and was given ativan and the symptoms went away. She said " she was told it was hyperventilation.  Her blood pressure is elevated today but she states she did not take her Diovan, Lasix, spironolactone due to travel and stated she would take them when she got home.  Labs with PCP 6/4/2024 showed elevated hematocrit and red blood cell count, elevated glucose 124, creatinine 1 GFR 58.4, normal liver enzymes, A1c 6.7, total cholesterol 202, triglycerides 125, , HDL 67, TSH 9.18, vitamin D 22.8.    Cardiac History:    Past Surgical History:   Procedure Laterality Date    CARDIOVASCULAR STRESS TEST  04/24/2002    7 min, 86% THR, Neg    CARDIOVASCULAR STRESS TEST  03/23/2010    4 min, 30 sec 88% THR. /88. Neg    CARDIOVASCULAR STRESS TEST  07/20/2012    L. Myoview- Negative    CARDIOVASCULAR STRESS TEST  01/28/2016    R.Stress- 7 Min, 10.1 METS. 91% THR. /104. Negative    CARDIOVASCULAR STRESS TEST  03/05/2019    8 Min. 10.1 METS. 87% THR. BP- 170/85. Negative.    CARDIOVASCULAR STRESS TEST  08/29/2023    4 Min. 7.0 METS. 88% THR. 208/79. EF 65%. Negative    CONVERTED (HISTORICAL) HOLTER  03/30/2004    AVG HR 68BPM    ECHO - CONVERTED  09/17/2007    EF >60% RVSP 42 mmHg    ECHO - CONVERTED  11/17/2008    EF >60%. RVSP 37 mmHg    ECHO - CONVERTED  07/08/2010    EF 65% RVSP- 50 mmHg    ECHO - CONVERTED  10/17/2011    EF >60% RVSP-43 mmHg    ECHO - CONVERTED  03/13/2014    EF 65% RVSP- 26mmHg    ECHO - CONVERTED  01/12/2016    EF 65%    ECHO - CONVERTED  03/05/2019    EF 65%. Mild MR. RVSP 31 mmHg    ECHO - CONVERTED  09/27/2021    EF 60%. Trace-Mild MR & AI. No shunt    ECHO - CONVERTED  08/29/2023    TLS. EF 65%. LA- 3.7. Trace-Mild MR. RVSP- 31 mmHg    OTHER SURGICAL HISTORY  02/27/2020    CTA renal arteries-no stenosis    OTHER SURGICAL HISTORY  11/30/2021    Event monitor- 30 Days. sinus bradycardia    US CAROTID UNILATERAL  03/20/2014    Normal    US CAROTID UNILATERAL  08/29/2023    Moderate stenosis (L) ICA       Current Outpatient Medications   Medication  Sig Dispense Refill    aspirin 81 MG EC tablet Take 1 tablet by mouth Daily. 90 tablet 3    atorvastatin (LIPITOR) 40 MG tablet Take 1 tablet by mouth Every Night. 90 tablet 4    buPROPion SR (WELLBUTRIN SR) 150 MG 12 hr tablet Take 1 tablet by mouth 2 (Two) Times a Day.      carvedilol (COREG) 6.25 MG tablet Take 1 tablet by mouth 2 (Two) Times a Day. 180 tablet 4    cholecalciferol (Vitamin D, Cholecalciferol,) 25 MCG (1000 UT) tablet Take 1 tablet by mouth Daily.      cloNIDine (CATAPRES) 0.2 MG tablet Take 1 tablet by mouth 3 (Three) Times a Day. 270 tablet 4    clopidogrel (PLAVIX) 75 MG tablet Take 1 tablet by mouth Daily. 90 tablet 3    ezetimibe (ZETIA) 10 MG tablet Take 1 tablet by mouth Daily. 90 tablet 3    furosemide (LASIX) 40 MG tablet Take 1 tablet twice a day as needed for edema 180 tablet 3    gabapentin (NEURONTIN) 800 MG tablet Take 1 tablet by mouth 3 (Three) Times a Day.      hydrALAZINE (APRESOLINE) 50 MG tablet Take 1 tablet by mouth 3 (Three) Times a Day. 270 tablet 3    levothyroxine (SYNTHROID, LEVOTHROID) 200 MCG tablet Take 1 tablet by mouth Daily.      levothyroxine (SYNTHROID, LEVOTHROID) 25 MCG tablet Take 1 tablet by mouth Every Morning.      metFORMIN (GLUCOPHAGE) 500 MG tablet Take 1 tablet by mouth 2 (Two) Times a Day With Meals.      NIFEdipine XL (PROCARDIA XL) 30 MG 24 hr tablet Take one tablet by mouth in the morning. 90 tablet 4    NIFEdipine XL (PROCARDIA XL) 90 MG 24 hr tablet Take 1 tablet by mouth Every Night. 90 tablet 4    spironolactone (ALDACTONE) 25 MG tablet Take 1 tablet by mouth Daily. 90 tablet 1    valsartan-hydrochlorothiazide (DIOVAN-HCT) 320-25 MG per tablet Take 1 tablet by mouth Daily. 90 tablet 4     No current facility-administered medications for this visit.       Codeine, Iodine, Morphine and codeine, and Other    Past Medical History:   Diagnosis Date    Aortic insufficiency     Diabetes mellitus     H/O bilateral cataract extraction     Hiatal hernia   "   Hyperlipidemia     Hypertension     Palpitations     Restless legs syndrome (RLS)     followed by Dr Atkins       Social History     Socioeconomic History    Marital status:    Tobacco Use    Smoking status: Never    Smokeless tobacco: Never   Vaping Use    Vaping status: Never Used   Substance and Sexual Activity    Alcohol use: No    Drug use: No    Sexual activity: Defer       Family History   Problem Relation Age of Onset    Diabetes Mother     Heart disease Mother        Vitals:   BP (!) 220/110 (BP Location: Left arm, Patient Position: Sitting) Comment: has not took valsartan or spironolactone yet  Pulse 60   Ht 147.3 cm (57.99\")   Wt 72.7 kg (160 lb 3.2 oz)   BMI 33.49 kg/m²     Physical Exam  Vitals and nursing note reviewed.   Neck:      Vascular: No carotid bruit.   Cardiovascular:      Rate and Rhythm: Normal rate and regular rhythm.      Pulses: Normal pulses.      Heart sounds: Murmur heard.      No friction rub. No gallop.   Pulmonary:      Effort: Pulmonary effort is normal.      Breath sounds: Normal breath sounds and air entry.   Musculoskeletal:      Right lower leg: No edema.      Left lower leg: No edema.   Skin:     General: Skin is warm and dry.      Capillary Refill: Capillary refill takes less than 2 seconds.   Neurological:      Mental Status: She is alert and oriented to person, place, and time.         ECG 12 Lead    Date/Time: 8/8/2024 1:10 PM  Performed by: Chelo Sal APRN    Authorized by: Chelo Sal APRN  Rhythm: sinus rhythm  Rate: normal  BPM: 62    Clinical impression: non-specific ECG  Comments: Qtc 425 ms           Assessment & Plan     Diagnoses and all orders for this visit:    1. Essential hypertension (Primary)  -     cloNIDine (CATAPRES) 0.2 MG tablet; Take 1 tablet by mouth 3 (Three) Times a Day.  Dispense: 270 tablet; Refill: 4  -     hydrALAZINE (APRESOLINE) 50 MG tablet; Take 1 tablet by mouth 3 (Three) Times a Day.  Dispense: 270 tablet; Refill: " 3  -     furosemide (LASIX) 40 MG tablet; Take 1 tablet twice a day as needed for edema  Dispense: 180 tablet; Refill: 3  -     valsartan-hydrochlorothiazide (DIOVAN-HCT) 320-25 MG per tablet; Take 1 tablet by mouth Daily.  Dispense: 90 tablet; Refill: 4    2. Shortness of breath  -     ECG 12 Lead  -     Treadmill Stress Test; Future    3. Noncompliance with medications    4. Pure hypercholesterolemia  -     atorvastatin (LIPITOR) 40 MG tablet; Take 1 tablet by mouth Every Night.  Dispense: 90 tablet; Refill: 4    Other orders  -     carvedilol (COREG) 6.25 MG tablet; Take 1 tablet by mouth 2 (Two) Times a Day.  Dispense: 180 tablet; Refill: 4  -     clopidogrel (PLAVIX) 75 MG tablet; Take 1 tablet by mouth Daily.  Dispense: 90 tablet; Refill: 3  -     ezetimibe (ZETIA) 10 MG tablet; Take 1 tablet by mouth Daily.  Dispense: 90 tablet; Refill: 3  -     NIFEdipine XL (PROCARDIA XL) 30 MG 24 hr tablet; Take one tablet by mouth in the morning.  Dispense: 90 tablet; Refill: 4  -     NIFEdipine XL (PROCARDIA XL) 90 MG 24 hr tablet; Take 1 tablet by mouth Every Night.  Dispense: 90 tablet; Refill: 4  -     spironolactone (ALDACTONE) 25 MG tablet; Take 1 tablet by mouth Daily.  Dispense: 90 tablet; Refill: 1    Hypertension/noncompliance with medication  - BP elevated; she had not taken her blood pressure medicine today  - Advised her to take her medication as soon as she got home    Shortness of breath  - Order treadmill stress test to evaluate for ischemia    Hypercholesterolemia  - Continue statin therapy  - Labs managed with PCP and lipids are listed above    Stress test to evaluate for ischemia. EKG SR rate 62 bpm, normal QTc     Visit Diagnoses:    ICD-10-CM ICD-9-CM   1. Essential hypertension  I10 401.9   2. Shortness of breath  R06.02 786.05   3. Noncompliance with medications  Z91.148 V15.81   4. Pure hypercholesterolemia  E78.00 272.0       Meds Ordered During Visit:  New Medications Ordered This Visit    Medications    atorvastatin (LIPITOR) 40 MG tablet     Sig: Take 1 tablet by mouth Every Night.     Dispense:  90 tablet     Refill:  4    carvedilol (COREG) 6.25 MG tablet     Sig: Take 1 tablet by mouth 2 (Two) Times a Day.     Dispense:  180 tablet     Refill:  4    cloNIDine (CATAPRES) 0.2 MG tablet     Sig: Take 1 tablet by mouth 3 (Three) Times a Day.     Dispense:  270 tablet     Refill:  4    hydrALAZINE (APRESOLINE) 50 MG tablet     Sig: Take 1 tablet by mouth 3 (Three) Times a Day.     Dispense:  270 tablet     Refill:  3    clopidogrel (PLAVIX) 75 MG tablet     Sig: Take 1 tablet by mouth Daily.     Dispense:  90 tablet     Refill:  3     Take with aspirin 81 mg, stop 325 mg    ezetimibe (ZETIA) 10 MG tablet     Sig: Take 1 tablet by mouth Daily.     Dispense:  90 tablet     Refill:  3    furosemide (LASIX) 40 MG tablet     Sig: Take 1 tablet twice a day as needed for edema     Dispense:  180 tablet     Refill:  3    NIFEdipine XL (PROCARDIA XL) 30 MG 24 hr tablet     Sig: Take one tablet by mouth in the morning.     Dispense:  90 tablet     Refill:  4    NIFEdipine XL (PROCARDIA XL) 90 MG 24 hr tablet     Sig: Take 1 tablet by mouth Every Night.     Dispense:  90 tablet     Refill:  4     Patient takes 90 mg QPM and 30 mg QAM    spironolactone (ALDACTONE) 25 MG tablet     Sig: Take 1 tablet by mouth Daily.     Dispense:  90 tablet     Refill:  1    valsartan-hydrochlorothiazide (DIOVAN-HCT) 320-25 MG per tablet     Sig: Take 1 tablet by mouth Daily.     Dispense:  90 tablet     Refill:  4       Follow Up Appointment:   Return in about 1 year (around 8/8/2025), or if symptoms worsen or fail to improve.           This document has been electronically signed by CHARLES Reich  August 8, 2024 16:25 EDT    Dictated Utilizing Dragon Dictation: Part of this note may be an electronic transcription/translation of spoken language to printed text using the Dragon Dictation System.

## 2024-09-04 ENCOUNTER — HOSPITAL ENCOUNTER (OUTPATIENT)
Dept: CARDIOLOGY | Facility: HOSPITAL | Age: 69
Discharge: HOME | End: 2024-09-04
Admitting: NURSE PRACTITIONER
Payer: MEDICARE

## 2024-09-04 DIAGNOSIS — R06.02 SHORTNESS OF BREATH: ICD-10-CM

## 2024-09-04 LAB
BH CV STRESS DURATION MIN STAGE 1: 3
BH CV STRESS DURATION SEC STAGE 1: 0
BH CV STRESS GRADE STAGE 1: 10
BH CV STRESS METS STAGE 1: 5
BH CV STRESS PROTOCOL 1: NORMAL
BH CV STRESS RECOVERY BP: NORMAL MMHG
BH CV STRESS RECOVERY HR: 96 BPM
BH CV STRESS SPEED STAGE 1: 1.7
BH CV STRESS STAGE 1: 1
MAXIMAL PREDICTED HEART RATE: 151 BPM
PERCENT MAX PREDICTED HR: 98.01 %
STRESS BASELINE BP: NORMAL MMHG
STRESS BASELINE HR: 65 BPM
STRESS PERCENT HR: 115 %
STRESS POST ESTIMATED WORKLOAD: 7 METS
STRESS POST EXERCISE DUR MIN: 6 MIN
STRESS POST PEAK BP: NORMAL MMHG
STRESS POST PEAK HR: 148 BPM
STRESS TARGET HR: 128 BPM

## 2024-09-04 PROCEDURE — 93017 CV STRESS TEST TRACING ONLY: CPT

## 2024-09-05 ENCOUNTER — TELEPHONE (OUTPATIENT)
Dept: CARDIOLOGY | Facility: CLINIC | Age: 69
End: 2024-09-05
Payer: MEDICARE

## 2024-09-05 NOTE — TELEPHONE ENCOUNTER
Caller: Ana Harry    Relationship: Self    Best call back number: [phone] (home)     What is the best time to reach you: [phone] (home)     Who are you requesting to speak with (clinical staff, provider,  specific staff member): CLINICAL      What was the call regarding: PT MISSED CALL. NO NOTES IN CHART. PLEASE CALL PT.

## 2024-09-16 ENCOUNTER — TELEPHONE (OUTPATIENT)
Dept: CARDIOLOGY | Facility: CLINIC | Age: 69
End: 2024-09-16
Payer: MEDICARE

## 2024-09-16 DIAGNOSIS — I10 ESSENTIAL HYPERTENSION: ICD-10-CM

## 2024-09-17 RX ORDER — HYDRALAZINE HYDROCHLORIDE 100 MG/1
100 TABLET, FILM COATED ORAL 3 TIMES DAILY
Qty: 270 TABLET | Refills: 3 | Status: SHIPPED | OUTPATIENT
Start: 2024-09-17

## 2024-09-30 RX ORDER — CARVEDILOL 6.25 MG/1
6.25 TABLET ORAL 2 TIMES DAILY
Qty: 180 TABLET | Refills: 3 | OUTPATIENT
Start: 2024-09-30

## 2025-05-27 RX ORDER — CLOPIDOGREL BISULFATE 75 MG/1
TABLET ORAL
Qty: 90 TABLET | Refills: 3 | Status: SHIPPED | OUTPATIENT
Start: 2025-05-27

## 2025-06-09 ENCOUNTER — TELEPHONE (OUTPATIENT)
Dept: CARDIOLOGY | Facility: CLINIC | Age: 70
End: 2025-06-09
Payer: MEDICARE

## 2025-06-09 DIAGNOSIS — R00.2 PALPITATIONS: Primary | ICD-10-CM

## 2025-06-09 NOTE — TELEPHONE ENCOUNTER
Hold carvedilol for heart rate less than 60.  I ordered a Holter monitor for 5 days.  Schedule her for a sooner appointment.  Also have her come in tomorrow or as soon as possible for a blood pressure check

## 2025-06-09 NOTE — TELEPHONE ENCOUNTER
Patient made aware to hold carvedilol for heart rate less than 60. Aware of order for holter monitor for 5 days. Patient aware of appointment tomorrow at 3 pm for BP check. Aware of need for sooner appointment.

## 2025-06-09 NOTE — TELEPHONE ENCOUNTER
Spoke with patient concerning symptoms and heart rate. Patient reports that yesterday she had slight pressure in chest, /120 with HR 43. She has felt weak and tired. No pain in chest today, /86 while speaking with patient and HR 51. She reports HR has been in the 40s today while at rest. She had concerns with low HR. Reports she is taking all medications as prescribed.

## 2025-06-09 NOTE — TELEPHONE ENCOUNTER
Caller: Ana Harry    Relationship: Self    Best call back number: [phone]      What is the best time to reach you: ANY    What was the call regarding: PATIENT STARTED FEELING PRESSURE IN CHEST LAST NIGHT, BUT FEELS A LITTLE BETTER TODAY. HOWEVER, HEART RATE WAS 43 LAST NIGHT, AND IS STILL IN THE 40'S THIS MORNING. PATIENT WOULD LIKE A CALL TO SPEAK WITH A NURSE ABOUT WHETHER OR NOT SHE NEEDS TO BE SEEN.    Is it okay if the provider responds through MyChart: PLEASE CALL

## 2025-06-10 ENCOUNTER — CLINICAL SUPPORT (OUTPATIENT)
Dept: CARDIOLOGY | Facility: CLINIC | Age: 70
End: 2025-06-10
Payer: MEDICARE

## 2025-06-10 ENCOUNTER — TELEPHONE (OUTPATIENT)
Dept: CARDIOLOGY | Facility: CLINIC | Age: 70
End: 2025-06-10

## 2025-06-10 VITALS — SYSTOLIC BLOOD PRESSURE: 160 MMHG | DIASTOLIC BLOOD PRESSURE: 100 MMHG | HEART RATE: 60 BPM

## 2025-06-10 DIAGNOSIS — Z01.30 BLOOD PRESSURE CHECK: Primary | ICD-10-CM

## 2025-06-10 RX ORDER — SPIRONOLACTONE 50 MG/1
50 TABLET, FILM COATED ORAL DAILY
Qty: 90 TABLET | Refills: 3 | Status: SHIPPED | OUTPATIENT
Start: 2025-06-10

## 2025-06-10 NOTE — TELEPHONE ENCOUNTER
Increase spironolactone to 50 mg.  I sent in the new dose to [facility].  She can just take 2 of the 25 mg tablets until it arrives

## 2025-06-10 NOTE — TELEPHONE ENCOUNTER
Patient was made aware to increase spironolactone to 50 mg daily. She was made aware that script was sent to [facility] and that she could take 2 tablets of the 25 mg of spironolactone until she gets in new prescription.

## 2025-06-10 NOTE — TELEPHONE ENCOUNTER
Patient came to office for BP check.     BP - 160/100 (left arm)  HR - 60    Medications:  Hydralazine 100 mg TID  Clonidine 0.2 mg TID  Lasix 40 mg BID PRN  Nifedipine XL 30 mg in the morning  Nifedipine XL 90 mg in the evening  Spironolactone 25 mg daily  Valsartan-HCTZ 320-25 mg daily  Coreg 12.5 mg BID (patient states that she only takes if her heart is racing, so she has not had today)

## 2025-06-11 NOTE — TELEPHONE ENCOUNTER
Attempted to contact patient concerning sooner appointment, unable to reach and unable to leave message related to no answering service.

## 2025-06-12 ENCOUNTER — TELEPHONE (OUTPATIENT)
Dept: CARDIOLOGY | Facility: CLINIC | Age: 70
End: 2025-06-12
Payer: MEDICARE

## 2025-06-12 NOTE — TELEPHONE ENCOUNTER
Caller: Ana Harry    Relationship: Self    Best call back number: [phone]    What is the best time to reach you: ANY    Who are you requesting to speak with (clinical staff, provider,  specific staff member): CLINICAL       What was the call regarding: PATIENT STATED WE CALLED THEM YESTERDAY NO CALL OR MESSAGE IN CHART PLEASE ADVISE    Is it okay if the provider responds through MyChart: NO

## 2025-06-25 ENCOUNTER — OFFICE VISIT (OUTPATIENT)
Dept: CARDIOLOGY | Facility: CLINIC | Age: 70
End: 2025-06-25
Payer: MEDICARE

## 2025-06-25 VITALS
DIASTOLIC BLOOD PRESSURE: 82 MMHG | WEIGHT: 149.2 LBS | SYSTOLIC BLOOD PRESSURE: 132 MMHG | HEART RATE: 43 BPM | HEIGHT: 58 IN | BODY MASS INDEX: 31.32 KG/M2 | OXYGEN SATURATION: 97 %

## 2025-06-25 DIAGNOSIS — E78.00 PURE HYPERCHOLESTEROLEMIA: ICD-10-CM

## 2025-06-25 DIAGNOSIS — R00.2 PALPITATIONS: ICD-10-CM

## 2025-06-25 DIAGNOSIS — I10 ESSENTIAL HYPERTENSION: Primary | ICD-10-CM

## 2025-06-25 RX ORDER — FUROSEMIDE 40 MG/1
TABLET ORAL
Qty: 180 TABLET | Refills: 3 | Status: SHIPPED | OUTPATIENT
Start: 2025-06-25

## 2025-06-25 RX ORDER — CLOPIDOGREL BISULFATE 75 MG/1
75 TABLET ORAL DAILY
Qty: 90 TABLET | Refills: 3 | Status: SHIPPED | OUTPATIENT
Start: 2025-06-25

## 2025-06-25 RX ORDER — NIFEDIPINE 90 MG/1
90 TABLET, EXTENDED RELEASE ORAL NIGHTLY
Qty: 90 TABLET | Refills: 3 | Status: SHIPPED | OUTPATIENT
Start: 2025-06-25

## 2025-06-25 RX ORDER — HYDRALAZINE HYDROCHLORIDE 100 MG/1
100 TABLET, FILM COATED ORAL 3 TIMES DAILY
Qty: 270 TABLET | Refills: 3 | Status: SHIPPED | OUTPATIENT
Start: 2025-06-25

## 2025-06-25 RX ORDER — EZETIMIBE 10 MG/1
10 TABLET ORAL DAILY
Qty: 90 TABLET | Refills: 3 | Status: SHIPPED | OUTPATIENT
Start: 2025-06-25

## 2025-06-25 RX ORDER — SPIRONOLACTONE 50 MG/1
50 TABLET, FILM COATED ORAL DAILY
Qty: 90 TABLET | Refills: 3 | Status: SHIPPED | OUTPATIENT
Start: 2025-06-25

## 2025-06-25 RX ORDER — ATORVASTATIN CALCIUM 40 MG/1
40 TABLET, FILM COATED ORAL NIGHTLY
Qty: 90 TABLET | Refills: 3 | Status: SHIPPED | OUTPATIENT
Start: 2025-06-25

## 2025-06-25 RX ORDER — NIFEDIPINE 30 MG/1
TABLET, EXTENDED RELEASE ORAL
Qty: 90 TABLET | Refills: 3 | Status: SHIPPED | OUTPATIENT
Start: 2025-06-25

## 2025-06-25 RX ORDER — VALSARTAN AND HYDROCHLOROTHIAZIDE 320; 25 MG/1; MG/1
1 TABLET, FILM COATED ORAL DAILY
Qty: 90 TABLET | Refills: 3 | Status: SHIPPED | OUTPATIENT
Start: 2025-06-25

## 2025-06-25 NOTE — PROGRESS NOTES
Chief Complaint   Patient presents with    Follow-up     Cardiac Management: PT states heart rate was staying low.     Med Refill     Cardiac Meds Refills needed - On Aspirin Daily 81 mg  Patient did not bring med list or medicine bottles to appointment, med list has been reviewed and updated based on patient's knowledge of their meds.      Labs Only     Last Labs 6/2024 scanned in chart    Shortness of Breath     Pt states was having a little SOA since last visit.        HPI:  HPI   Ana Harry is a 69 y.o. female with labile HTN, hyperlipidemia, diabetes and hypothyroidism.  Stress test have shown no ischemia. Renal artery US followed by CTA showed no VALERIANO. She is on multiple, layered antihypertensives. Minoxidil was titrated up and blood pressure control.  In September 2021 she was hospitalized secondary to CVA, remote lacunar infarct noted. Etiology was  disease.  She was treated with DAPT for 3 weeks then graduated to 325 mg aspirin.  Statin was changed to higher intensity atorvastatin.  At recent visit with neurologist, Dr. Jeffers, current was continued with plan to follow on an annual basis.     Labs with PCP 6/4/2024 showed elevated hematocrit and red blood cell count, elevated glucose 124, creatinine 1 GFR 58.4, normal liver enzymes, A1c 6.7, total cholesterol 202, triglycerides 125, , HDL 67, TSH 9.18, vitamin D 22.8.    She returns today for follow-up visit. Labs 1/2025: Normal CBC, creatinine 0.8, GFR 75.4, normal liver enzymes, total cholesterol 197, triglycerides 175, HDL 56, , A1c 6.7, TSH 11.3. She stated she had more labs 6/2025 which I do not have access to. Patient denies chest pain, pressure, palpitations, tightness, dizziness, shortness of air.  She is not having cardiac symptoms just feels tired and weak.  EKG today in office sinus bradycardia rate 45 bpm normal QTc and HI interval.    Cardiac History:    Past Surgical History:   Procedure Laterality Date     CARDIOVASCULAR STRESS TEST  04/24/2002    7 min, 86% THR, Neg    CARDIOVASCULAR STRESS TEST  03/23/2010    4 min, 30 sec 88% THR. /88. Neg    CARDIOVASCULAR STRESS TEST  07/20/2012    L. Myoview- Negative    CARDIOVASCULAR STRESS TEST  01/28/2016    R.Stress- 7 Min, 10.1 METS. 91% THR. /104. Negative    CARDIOVASCULAR STRESS TEST  03/05/2019    8 Min. 10.1 METS. 87% THR. BP- 170/85. Negative.    CARDIOVASCULAR STRESS TEST  08/29/2023    4 Min. 7.0 METS. 88% THR. 208/79. EF 65%. Negative    CONVERTED (HISTORICAL) HOLTER  03/30/2004    AVG HR 68BPM    ECHO - CONVERTED  09/17/2007    EF >60% RVSP 42 mmHg    ECHO - CONVERTED  11/17/2008    EF >60%. RVSP 37 mmHg    ECHO - CONVERTED  07/08/2010    EF 65% RVSP- 50 mmHg    ECHO - CONVERTED  10/17/2011    EF >60% RVSP-43 mmHg    ECHO - CONVERTED  03/13/2014    EF 65% RVSP- 26mmHg    ECHO - CONVERTED  01/12/2016    EF 65%    ECHO - CONVERTED  03/05/2019    EF 65%. Mild MR. RVSP 31 mmHg    ECHO - CONVERTED  09/27/2021    EF 60%. Trace-Mild MR & AI. No shunt    ECHO - CONVERTED  08/29/2023    TLS. EF 65%. LA- 3.7. Trace-Mild MR. RVSP- 31 mmHg    EXERCISE STRESS - CONVERTED  09/04/2024    R.Stress. 6 Min. 7.0 METS. > 100% THR. 185/89. Negative    OTHER SURGICAL HISTORY  02/27/2020    CTA renal arteries-no stenosis    OTHER SURGICAL HISTORY  11/30/2021    Event monitor- 30 Days. sinus bradycardia    US CAROTID UNILATERAL  03/20/2014    Normal    US CAROTID UNILATERAL  08/29/2023    Moderate stenosis (L) ICA       Current Outpatient Medications   Medication Sig Dispense Refill    aspirin 81 MG EC tablet Take 1 tablet by mouth Daily. 90 tablet 3    atorvastatin (LIPITOR) 40 MG tablet Take 1 tablet by mouth Every Night. 90 tablet 3    buPROPion SR (WELLBUTRIN SR) 150 MG 12 hr tablet Take 1 tablet by mouth 2 (Two) Times a Day.      cholecalciferol (Vitamin D, Cholecalciferol,) 25 MCG (1000 UT) tablet Take 1 tablet by mouth Daily.      cloNIDine (CATAPRES) 0.2 MG tablet  Take 1 tablet by mouth 3 (Three) Times a Day. 270 tablet 4    clopidogrel (PLAVIX) 75 MG tablet Take 1 tablet by mouth Daily. 90 tablet 3    ezetimibe (ZETIA) 10 MG tablet Take 1 tablet by mouth Daily. 90 tablet 3    furosemide (LASIX) 40 MG tablet Take 1 tablet twice a day as needed for edema 180 tablet 3    gabapentin (NEURONTIN) 800 MG tablet Take 1 tablet by mouth 3 (Three) Times a Day.      hydrALAZINE (APRESOLINE) 100 MG tablet Take 1 tablet by mouth 3 (Three) Times a Day. 270 tablet 3    levothyroxine (SYNTHROID, LEVOTHROID) 200 MCG tablet Take 1 tablet by mouth Daily.      levothyroxine (SYNTHROID, LEVOTHROID) 25 MCG tablet Take 1 tablet by mouth Every Morning. (Patient taking differently: Take 1 tablet by mouth 3 (Three) Times a Day. All at once in the morning per PCP)      metFORMIN (GLUCOPHAGE) 500 MG tablet Take 1 tablet by mouth 2 (Two) Times a Day With Meals.      NIFEdipine XL (PROCARDIA XL) 30 MG 24 hr tablet Take one tablet by mouth in the morning. 90 tablet 3    NIFEdipine XL (PROCARDIA XL) 90 MG 24 hr tablet Take 1 tablet by mouth Every Night. 90 tablet 3    spironolactone (ALDACTONE) 50 MG tablet Take 1 tablet by mouth Daily. 90 tablet 3    valsartan-hydrochlorothiazide (DIOVAN-HCT) 320-25 MG per tablet Take 1 tablet by mouth Daily. 90 tablet 3     No current facility-administered medications for this visit.       Codeine, Iodine, Morphine and codeine, and Other    Past Medical History:   Diagnosis Date    Aortic insufficiency     Diabetes mellitus     H/O bilateral cataract extraction     Hiatal hernia     Hyperlipidemia     Hypertension     Palpitations     Restless legs syndrome (RLS)     followed by Dr Atkins       Social History     Socioeconomic History    Marital status:    Tobacco Use    Smoking status: Never    Smokeless tobacco: Never   Vaping Use    Vaping status: Never Used   Substance and Sexual Activity    Alcohol use: No    Drug use: No    Sexual activity: Defer  "      Family History   Problem Relation Age of Onset    Diabetes Mother     Heart disease Mother        Vitals:   /82 (BP Location: Left arm, Patient Position: Sitting, Cuff Size: Adult)   Pulse (!) 43   Ht 147.3 cm (57.99\")   Wt 67.7 kg (149 lb 3.2 oz)   SpO2 97%   BMI 31.19 kg/m²     Physical Exam  Vitals and nursing note reviewed.   Constitutional:       Appearance: She is obese.   Neck:      Vascular: No carotid bruit.   Cardiovascular:      Rate and Rhythm: Normal rate and regular rhythm.      Pulses: Normal pulses.      Heart sounds: Murmur heard.      No friction rub. No gallop.   Pulmonary:      Effort: Pulmonary effort is normal.      Breath sounds: Normal breath sounds and air entry.   Musculoskeletal:      Right lower leg: No edema.      Left lower leg: No edema.   Skin:     General: Skin is warm and dry.      Capillary Refill: Capillary refill takes less than 2 seconds.   Neurological:      Mental Status: She is alert and oriented to person, place, and time.         ECG 12 Lead    Date/Time: 6/25/2025 4:36 PM  Performed by: Chelo Sal APRN    Authorized by: Chelo Sal APRN  Comparison: compared with previous ECG from 8/8/2024  Comparison to previous ECG: Sinus rhythm rate 62 bpm  Rhythm: sinus bradycardia  Rate: bradycardic  BPM: 45  Comments: QTc 406 ms           Assessment & Plan     Diagnoses and all orders for this visit:    1. Essential hypertension (Primary)  -     furosemide (LASIX) 40 MG tablet; Take 1 tablet twice a day as needed for edema  Dispense: 180 tablet; Refill: 3  -     hydrALAZINE (APRESOLINE) 100 MG tablet; Take 1 tablet by mouth 3 (Three) Times a Day.  Dispense: 270 tablet; Refill: 3  -     NIFEdipine XL (PROCARDIA XL) 30 MG 24 hr tablet; Take one tablet by mouth in the morning.  Dispense: 90 tablet; Refill: 3  -     NIFEdipine XL (PROCARDIA XL) 90 MG 24 hr tablet; Take 1 tablet by mouth Every Night.  Dispense: 90 tablet; Refill: 3  -     spironolactone (ALDACTONE) " 50 MG tablet; Take 1 tablet by mouth Daily.  Dispense: 90 tablet; Refill: 3  -     valsartan-hydrochlorothiazide (DIOVAN-HCT) 320-25 MG per tablet; Take 1 tablet by mouth Daily.  Dispense: 90 tablet; Refill: 3  -     ECG 12 Lead    2. Pure hypercholesterolemia  -     atorvastatin (LIPITOR) 40 MG tablet; Take 1 tablet by mouth Every Night.  Dispense: 90 tablet; Refill: 3  -     ezetimibe (ZETIA) 10 MG tablet; Take 1 tablet by mouth Daily.  Dispense: 90 tablet; Refill: 3    3. Palpitations  -     ECG 12 Lead    Other orders  -     clopidogrel (PLAVIX) 75 MG tablet; Take 1 tablet by mouth Daily.  Dispense: 90 tablet; Refill: 3    HTN  - BP controlled  - Continue clonidine, Lasix, hydralazine, nifedipine, spironolactone, Diovan    Hypercholesterolemia  - Labs with PCP  - Continue Zetia and atorvastatin    Palpitations  - EKG today sinus bradycardia rate 45 bpm  - Awaiting Holter monitor which was sent back and we have not received results yet    Stop Coreg. Refills sent. Consider EP referral for possible PPM depending on results of Holter monitor that was mailed back yesterday.  We have not seen results yet    Visit Diagnoses:    ICD-10-CM ICD-9-CM   1. Essential hypertension  I10 401.9   2. Pure hypercholesterolemia  E78.00 272.0   3. Palpitations  R00.2 785.1       Meds Ordered During Visit:  New Medications Ordered This Visit   Medications    atorvastatin (LIPITOR) 40 MG tablet     Sig: Take 1 tablet by mouth Every Night.     Dispense:  90 tablet     Refill:  3    clopidogrel (PLAVIX) 75 MG tablet     Sig: Take 1 tablet by mouth Daily.     Dispense:  90 tablet     Refill:  3    ezetimibe (ZETIA) 10 MG tablet     Sig: Take 1 tablet by mouth Daily.     Dispense:  90 tablet     Refill:  3    furosemide (LASIX) 40 MG tablet     Sig: Take 1 tablet twice a day as needed for edema     Dispense:  180 tablet     Refill:  3    hydrALAZINE (APRESOLINE) 100 MG tablet     Sig: Take 1 tablet by mouth 3 (Three) Times a Day.      Dispense:  270 tablet     Refill:  3    NIFEdipine XL (PROCARDIA XL) 30 MG 24 hr tablet     Sig: Take one tablet by mouth in the morning.     Dispense:  90 tablet     Refill:  3    NIFEdipine XL (PROCARDIA XL) 90 MG 24 hr tablet     Sig: Take 1 tablet by mouth Every Night.     Dispense:  90 tablet     Refill:  3     Patient takes 90 mg QPM and 30 mg QAM    spironolactone (ALDACTONE) 50 MG tablet     Sig: Take 1 tablet by mouth Daily.     Dispense:  90 tablet     Refill:  3    valsartan-hydrochlorothiazide (DIOVAN-HCT) 320-25 MG per tablet     Sig: Take 1 tablet by mouth Daily.     Dispense:  90 tablet     Refill:  3       Follow Up Appointment:   Return in about 6 months (around 12/25/2025), or if symptoms worsen or fail to improve.           This document has been electronically signed by CHARLES Reich  June 27, 2025 11:22 EDT    Dictated Utilizing Dragon Dictation: Part of this note may be an electronic transcription/translation of spoken language to printed text using the Dragon Dictation System.

## 2025-06-30 ENCOUNTER — RESULTS FOLLOW-UP (OUTPATIENT)
Dept: CARDIOLOGY | Facility: CLINIC | Age: 70
End: 2025-06-30
Payer: MEDICARE

## 2025-07-02 ENCOUNTER — TELEPHONE (OUTPATIENT)
Dept: CARDIOLOGY | Facility: CLINIC | Age: 70
End: 2025-07-02
Payer: MEDICARE

## 2025-07-02 NOTE — TELEPHONE ENCOUNTER
Spoke with patient concerning eliquis script. Advised will re send script for eliquis. Understanding voiced.

## 2025-07-02 NOTE — TELEPHONE ENCOUNTER
Caller: Ana Harry    Relationship: Self    Best call back number: [phone]    What is the best time to reach you: ANY    Who are you requesting to speak with (clinical staff, provider,  specific staff member): CLINICAL    What was the call regarding: PT IS CALLING IN ASKING ABOUT THE STATUS OF HER ELIQUIS MEDICATION. SHE IS STOPPING THE PLAVIX AND WAS TOLD THAT THE ELIQUIS WOULD BE SUBSTITUTED. THIS IS GOING TO [facility],     PLEASE CALL PT

## 2025-08-08 DIAGNOSIS — I10 ESSENTIAL HYPERTENSION: ICD-10-CM

## 2025-08-08 RX ORDER — CLONIDINE HYDROCHLORIDE 0.2 MG/1
0.2 TABLET ORAL 3 TIMES DAILY
Qty: 270 TABLET | Refills: 3 | Status: SHIPPED | OUTPATIENT
Start: 2025-08-08

## 2025-08-08 RX ORDER — NIFEDIPINE 90 MG/1
TABLET, EXTENDED RELEASE ORAL
Qty: 90 TABLET | Refills: 3 | Status: SHIPPED | OUTPATIENT
Start: 2025-08-08